# Patient Record
Sex: FEMALE | Race: WHITE | URBAN - METROPOLITAN AREA
[De-identification: names, ages, dates, MRNs, and addresses within clinical notes are randomized per-mention and may not be internally consistent; named-entity substitution may affect disease eponyms.]

---

## 2017-01-06 ENCOUNTER — TRANSFERRED RECORDS (OUTPATIENT)
Dept: HEALTH INFORMATION MANAGEMENT | Facility: CLINIC | Age: 37
End: 2017-01-06

## 2017-01-06 LAB
ALT SERPL-CCNC: 26 U/L (ref 9–52)
AST SERPL-CCNC: 19 U/L (ref 14–36)
CREAT SERPL-MCNC: 0.8 MG/DL (ref 0.7–1.2)
GLUCOSE SERPL-MCNC: 80 MG/DL (ref 74–106)
POTASSIUM SERPL-SCNC: 3.9 MEQ/L (ref 3.5–5.1)

## 2017-01-16 LAB
CREAT SERPL-MCNC: 0.8 MG/DL (ref 0.7–1.2)
GLUCOSE SERPL-MCNC: 77 MG/DL (ref 74–106)
POTASSIUM SERPL-SCNC: 3.5 MEQ/L (ref 3.5–5.1)

## 2017-03-02 ENCOUNTER — TRANSFERRED RECORDS (OUTPATIENT)
Dept: HEALTH INFORMATION MANAGEMENT | Facility: CLINIC | Age: 37
End: 2017-03-02

## 2017-05-26 ENCOUNTER — TRANSFERRED RECORDS (OUTPATIENT)
Dept: HEALTH INFORMATION MANAGEMENT | Facility: CLINIC | Age: 37
End: 2017-05-26

## 2017-05-26 LAB
ALT SERPL-CCNC: 24 U/L (ref 9–52)
AST SERPL-CCNC: 21 U/L (ref 14–36)
CREAT SERPL-MCNC: 0.7 MG/DL (ref 0.7–1.2)
GLUCOSE SERPL-MCNC: 79 MG/DL (ref 74–106)
POTASSIUM SERPL-SCNC: 3.4 MEQ/L (ref 3.5–5.1)

## 2017-06-15 ENCOUNTER — TRANSFERRED RECORDS (OUTPATIENT)
Dept: HEALTH INFORMATION MANAGEMENT | Facility: CLINIC | Age: 37
End: 2017-06-15

## 2017-06-23 ENCOUNTER — TRANSFERRED RECORDS (OUTPATIENT)
Dept: HEALTH INFORMATION MANAGEMENT | Facility: CLINIC | Age: 37
End: 2017-06-23

## 2017-06-23 LAB — INR PPP: 1 (ref 0.9–1.2)

## 2017-06-25 ENCOUNTER — TRANSFERRED RECORDS (OUTPATIENT)
Dept: HEALTH INFORMATION MANAGEMENT | Facility: CLINIC | Age: 37
End: 2017-06-25

## 2017-06-25 LAB
CREAT SERPL-MCNC: 0.6 MG/DL (ref 0.7–1.2)
GLUCOSE SERPL-MCNC: 129 MG/DL (ref 74–106)
POTASSIUM SERPL-SCNC: 4 MEQ/L (ref 3.5–5.1)

## 2017-06-29 ENCOUNTER — TRANSFERRED RECORDS (OUTPATIENT)
Dept: HEALTH INFORMATION MANAGEMENT | Facility: CLINIC | Age: 37
End: 2017-06-29

## 2018-04-06 ENCOUNTER — TELEPHONE (OUTPATIENT)
Dept: NEUROLOGY | Facility: CLINIC | Age: 38
End: 2018-04-06

## 2018-04-23 ENCOUNTER — OFFICE VISIT (OUTPATIENT)
Dept: NEUROLOGY | Facility: CLINIC | Age: 38
End: 2018-04-23
Payer: COMMERCIAL

## 2018-04-23 ENCOUNTER — ALLIED HEALTH/NURSE VISIT (OUTPATIENT)
Dept: NEUROLOGY | Facility: CLINIC | Age: 38
End: 2018-04-23
Payer: COMMERCIAL

## 2018-04-23 VITALS
HEART RATE: 85 BPM | SYSTOLIC BLOOD PRESSURE: 123 MMHG | DIASTOLIC BLOOD PRESSURE: 82 MMHG | BODY MASS INDEX: 25.03 KG/M2 | TEMPERATURE: 99.7 F | HEIGHT: 64 IN | WEIGHT: 146.6 LBS

## 2018-04-23 DIAGNOSIS — R56.9 SEIZURES (H): Primary | ICD-10-CM

## 2018-04-23 DIAGNOSIS — R56.9 CONVULSIONS, UNSPECIFIED CONVULSION TYPE (H): Primary | ICD-10-CM

## 2018-04-23 LAB
ALBUMIN SERPL-MCNC: 3.8 G/DL (ref 3.4–5)
ALP SERPL-CCNC: 40 U/L (ref 40–150)
ALT SERPL W P-5'-P-CCNC: 16 U/L (ref 0–50)
ANION GAP SERPL CALCULATED.3IONS-SCNC: 11 MMOL/L (ref 3–14)
AST SERPL W P-5'-P-CCNC: 22 U/L (ref 0–45)
BILIRUB SERPL-MCNC: 0.2 MG/DL (ref 0.2–1.3)
BUN SERPL-MCNC: 8 MG/DL (ref 7–30)
CALCIUM SERPL-MCNC: 8.6 MG/DL (ref 8.5–10.1)
CHLORIDE SERPL-SCNC: 110 MMOL/L (ref 94–109)
CO2 SERPL-SCNC: 19 MMOL/L (ref 20–32)
CREAT SERPL-MCNC: 0.73 MG/DL (ref 0.52–1.04)
ERYTHROCYTE [DISTWIDTH] IN BLOOD BY AUTOMATED COUNT: 12.6 % (ref 10–15)
GFR SERPL CREATININE-BSD FRML MDRD: 90 ML/MIN/1.7M2
GLUCOSE SERPL-MCNC: 76 MG/DL (ref 70–99)
HCT VFR BLD AUTO: 41.1 % (ref 35–47)
HGB BLD-MCNC: 13.7 G/DL (ref 11.7–15.7)
MCH RBC QN AUTO: 32.5 PG (ref 26.5–33)
MCHC RBC AUTO-ENTMCNC: 33.3 G/DL (ref 31.5–36.5)
MCV RBC AUTO: 98 FL (ref 78–100)
PLATELET # BLD AUTO: 251 10E9/L (ref 150–450)
POTASSIUM SERPL-SCNC: 4 MMOL/L (ref 3.4–5.3)
PROT SERPL-MCNC: 6.9 G/DL (ref 6.8–8.8)
RBC # BLD AUTO: 4.21 10E12/L (ref 3.8–5.2)
SODIUM SERPL-SCNC: 140 MMOL/L (ref 133–144)
TSH SERPL DL<=0.005 MIU/L-ACNC: 1.97 MU/L (ref 0.4–4)
VIT B12 SERPL-MCNC: 253 PG/ML (ref 193–986)
WBC # BLD AUTO: 7.5 10E9/L (ref 4–11)

## 2018-04-23 RX ORDER — BUSPIRONE HYDROCHLORIDE 15 MG/1
22.5 TABLET ORAL 2 TIMES DAILY
COMMUNITY

## 2018-04-23 RX ORDER — TOPIRAMATE 50 MG/1
150 TABLET, FILM COATED ORAL 2 TIMES DAILY
Status: ON HOLD | COMMUNITY
End: 2018-05-09

## 2018-04-23 RX ORDER — PRAZOSIN HYDROCHLORIDE 2 MG/1
6 CAPSULE ORAL AT BEDTIME
COMMUNITY

## 2018-04-23 RX ORDER — TRAZODONE HYDROCHLORIDE 50 MG/1
50 TABLET, FILM COATED ORAL AT BEDTIME
COMMUNITY

## 2018-04-23 RX ORDER — LORAZEPAM 1 MG/1
1 TABLET ORAL
COMMUNITY

## 2018-04-23 RX ORDER — DESVENLAFAXINE 100 MG/1
100 TABLET, EXTENDED RELEASE ORAL DAILY
COMMUNITY

## 2018-04-23 RX ORDER — BUTALBITAL, ACETAMINOPHEN AND CAFFEINE 300; 40; 50 MG/1; MG/1; MG/1
1 CAPSULE ORAL
COMMUNITY

## 2018-04-23 ASSESSMENT — PAIN SCALES - GENERAL: PAINLEVEL: SEVERE PAIN (6)

## 2018-04-23 NOTE — MR AVS SNAPSHOT
After Visit Summary   4/23/2018    Jaja Patton    MRN: [id number]           Patient Information     Date Of Birth          1980        Visit Information        Provider Department      4/23/2018 1:00 PM [facility]        Today's Diagnoses     Convulsions, unspecified convulsion type (H)    -  1       Follow-ups after your visit        Your next 10 appointments already scheduled     Apr 24, 2018  8:00 AM CDT   New Patient Visit with Masood Laurent, PhD [facility] ([facility])    [address], [address]  [place] [address]   [phone]            Apr 24, 2018  1:00 PM CDT   Education with [facility] Nurse 2, [facility] PATIENT EDUCATION   [facility] ([facility])    [address], [address]  [place] [address]   [phone]            Apr 25, 2018 10:00 AM CDT   (Arrive by 9:15 AM)   Video Hookup Visit with [facility] EEG TECH 4   [facility] ([facility])    [facility]  [address] [address]   [phone]           [place]: Your appointment is scheduled at [facility]. [address] [place] [address]              Future tests that were ordered for you today     Open Future Orders        Priority Expected Expires Ordered    MR Brain w/o & w Contrast Routine  4/23/2019 4/23/2018            Who to contact     Please call your clinic at [phone] to:    Ask questions about your health    Make or cancel appointments    Discuss your medicines    Learn about your test results    Speak to your doctor            Additional Information About Your Visit        MyChart Information     ArriveBefore is an electronic gateway that provides easy, online access to your medical records. With ArriveBefore, you can request a clinic appointment, read your test results, renew a prescription or communicate with your  "care team.     To sign up for [employer] visit the website at [email or web address]   You will be asked to enter the access code listed below, as well as some personal information. Please follow the directions to create your username and password.     Your access code is: [email or web address]  Expires: 2018  2:48 PM     Your access code will  in 90 days. If you need help or a new code, please contact your [facility] or call [phone] for assistance.        Care EveryWhere ID     This is your Care EveryWhere ID. This could be used by other organizations to access your [place] medical records  [id number]        Your Vitals Were     Pulse Temperature Height BMI (Body Mass Index)          85 99.7  F (37.6  C) (Temporal) 5' 4\" (162.6 cm) 25.16 kg/m2         Blood Pressure from Last 3 Encounters:   18 123/82    Weight from Last 3 Encounters:   18 146 lb 9.6 oz (66.5 kg)              We Performed the Following     Admit to Inpatient     CBC with platelets     Comprehensive metabolic panel     Topiramate Level     TSH with free T4 reflex     Vitamin B12        Primary Care Provider Fax #    Physician No Ref-Primary [phone]       No address on file        Equal Access to Services     JUAN CONNOR : Hadii aad ku hadasho Soomaali, waaxda luqadaha, qaybta kaalmada adeegyada, waxay joaquinin kvng whitfield . So [facility] [phone].    ATENCIÓN: Si habla español, tiene a waters disposición servicios gratuitos de asistencia lingüística. Llame al [phone].    We comply with applicable federal civil rights laws and [place] laws. We do not discriminate on the basis of race, color, national origin, age, disability, sex, sexual orientation, or gender identity.            Thank you!     Thank you for choosing [facility]  for your care. Our goal is always to provide you with excellent care. Hearing back from our patients is one way we can continue to improve " our services. Please take a few minutes to complete the written survey that you may receive in the mail after your visit with us. Thank you!             Your Updated Medication List - Protect others around you: Learn how to safely use, store and throw away your medicines at www.disposemymeds.org.          This list is accurate as of 4/23/18  2:53 PM.  Always use your most recent med list.                   Brand Name Dispense Instructions for use Diagnosis    busPIRone 15 MG tablet    BUSPAR     Take 15 mg by mouth 3 times daily Take one and one-half tablets by mouth two times daily        CALCIUM 600 PO      Take 600 mg by mouth daily        CLONAZEPAM PO      Take 1 mg by mouth 2 times daily Take 1- and one-half tablets by mouth 2 times daily        desvenlafaxine 100 MG 24 hr tablet    KHEDEZLA     Take by mouth daily        FIORICET -40 MG Caps   Generic drug:  Butalbital-APAP-Caffeine      Take 1 capsule by mouth as needed        FYCOMPA 12 MG tablet   Generic drug:  perampanel      Take 12 mg by mouth At Bedtime        LORazepam 1 MG tablet    ATIVAN     Take 1 mg by mouth every 6 hours as needed for anxiety        METHYLPHENIDATE HCL PO      Take 36 mg by mouth daily        Milk Thistle 175 MG Caps      Take 175 mg by mouth daily        prazosin 2 MG capsule    MINIPRESS     Take 2 mg by mouth At Bedtime Take 3 tablets at bedtime        topiramate 50 MG tablet    TOPAMAX     Take by mouth 2 times daily Take 3 tablets two times a day        traZODone 50 MG tablet    DESYREL     Take 50 mg by mouth At Bedtime Take 2-4 tablets by mouth at bedtime        ZANTAC PO      Take 150 mg by mouth as needed for heartburn

## 2018-04-23 NOTE — MR AVS SNAPSHOT
After Visit Summary   2018    Jaja Patton    MRN: [id number]           Patient Information     Date Of Birth          1980        Visit Information        Provider Department      2018 9:30 AM [facility]        Today's Diagnoses     Seizures (H)    -  1       Follow-ups after your visit        Who to contact     Please call your clinic at [phone] to:    Ask questions about your health    Make or cancel appointments    Discuss your medicines    Learn about your test results    Speak to your doctor            Additional Information About Your Visit        MyChart Information     W[employer] Style is an electronic gateway that provides easy, online access to your medical records. With Woo With Style, you can request a clinic appointment, read your test results, renew a prescription or communicate with your care team.     To sign up for [employer] With Style visit the website at [email or web address]   You will be asked to enter the access code listed below, as well as some personal information. Please follow the directions to create your username and password.     Your access code is: [email or web address]  Expires: 2018  2:48 PM     Your access code will  in 90 days. If you need help or a new code, please contact your [facility] or call [phone] for assistance.        Care EveryWhere ID     This is your Care EveryWhere ID. This could be used by other organizations to access your [place] medical records  [id number]         Blood Pressure from Last 3 Encounters:   No data found for BP    Weight from Last 3 Encounters:   No data found for Wt              Today, you had the following     No orders found for display       Primary Care Provider Fax #    Physician No Ref-Primary [phone]       No address on file        Equal Access to Services     JUAN CONNOR AH: Zaria Staley, yany egan, jaquan castillo  kvng guerdalida yuniorjair bennettangel ah. So [facility] [phone].    ATENCIÓN: Si makila juliana, tiene a waters disposición servicios gratuitos de asistencia lingüística. Jesus al [phone].    We comply with applicable federal civil rights laws and [place] laws. We do not discriminate on the basis of race, color, national origin, age, disability, sex, sexual orientation, or gender identity.            Thank you!     Thank you for choosing [facility]  for your care. Our goal is always to provide you with excellent care. Hearing back from our patients is one way we can continue to improve our services. Please take a few minutes to complete the written survey that you may receive in the mail after your visit with us. Thank you!             Your Updated Medication List - Protect others around you: Learn how to safely use, store and throw away your medicines at www.disposemymeds.org.          This list is accurate as of 4/23/18 11:59 PM.  Always use your most recent med list.                   Brand Name Dispense Instructions for use Diagnosis    busPIRone 15 MG tablet    BUSPAR     Take 22.5 mg by mouth 2 times daily        CALCIUM 600 PO      Take 600 mg by mouth daily        CLONAZEPAM PO      Take 1.5 mg by mouth 2 times daily        desvenlafaxine 100 MG 24 hr tablet    KHEDEZLA     Take 100 mg by mouth daily        FIORICET -40 MG Caps   Generic drug:  Butalbital-APAP-Caffeine      Take 1 capsule by mouth q4-6 hours prn migraine        FYCOMPA 12 MG tablet   Generic drug:  perampanel      Take 12 mg by mouth At Bedtime        LORazepam 1 MG tablet    ATIVAN     Take 1 mg by mouth For seizure activity, may repeat once after 30 minutes if needed for continued seizure activity        Methylphenidate HCl ER 36 MG Tb24      Take 36 mg by mouth daily        Milk Thistle 175 MG Caps      Take 175 mg by mouth daily        prazosin 2 MG capsule    MINIPRESS     Take 6 mg by mouth At Bedtime        topiramate 50 MG  tablet    TOPAMAX     Take 150 mg by mouth 2 times daily        traZODone 50 MG tablet    DESYREL     Take 50 mg by mouth At Bedtime Take 2-4 tablets by mouth at bedtime        ZANTAC PO      Take 150 mg by mouth 2 times daily as needed for heartburn
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
## 2018-04-23 NOTE — PROGRESS NOTES
CPT:62885-73  OP/3hr Video EEG  [facility]  Dr. Gregory

## 2018-04-24 ENCOUNTER — ALLIED HEALTH/NURSE VISIT (OUTPATIENT)
Dept: NEUROLOGY | Facility: CLINIC | Age: 38
End: 2018-04-24
Payer: COMMERCIAL

## 2018-04-24 ENCOUNTER — OFFICE VISIT (OUTPATIENT)
Dept: NEUROLOGY | Facility: CLINIC | Age: 38
End: 2018-04-24
Payer: COMMERCIAL

## 2018-04-24 DIAGNOSIS — R56.9 SEIZURES (H): Primary | ICD-10-CM

## 2018-04-24 DIAGNOSIS — G40.019 LOCALIZATION-RELATED IDIOPATHIC EPILEPSY AND EPILEPTIC SYNDROMES WITH SEIZURES OF LOCALIZED ONSET, INTRACTABLE, WITHOUT STATUS EPILEPTICUS (H): Primary | ICD-10-CM
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
## 2018-04-24 NOTE — MR AVS SNAPSHOT
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 [address] [place] [id number]            Apr 29, 2018  7:00 AM CDT   24 Hour Video Visit with [facility] EEG TECH 4   [facility] ([facility])    [address] [address]   [phone]           [place]: Your appointment is scheduled at [facility]. [address] [place] [id number]              Who to contact     Please call your clinic at [phone] to:    Ask questions about your health    Make or cancel appointments    Discuss your medicines    Learn about your test results    Speak to your doctor            Additional Information About Your Visit        MyChart Information     [employer] is an electronic gateway that provides easy, online access to your medical records. With [employer] you can request a clinic appointment, read your test results, renew a prescription or communicate with your care team.     To sign up for [employer] visit the website at [email or web address]   You will be asked to enter the access code listed below, as well as some personal information.  Please follow the directions to create your username and password.     Your access code is: [email or web address]  Expires: 2018  2:48 PM     Your access code will  in 90 days. If you need help or a new code, please contact your [facility] or call [phone] for assistance.        Care EveryWhere ID     This is your Care EveryWhere ID. This could be used by other organizations to access your [facility] medical records  [id number]         Blood Pressure from Last 3 Encounters:   18 143/73   18 123/82    Weight from Last 3 Encounters:   18 65.3 kg (144 lb)   18 66.5 kg (146 lb 9.6 oz)              We Performed the Following     51835-[id number] TESTING, PER HR/PSYCHOLOGIST     NEUROPSYCH TESTING BY [facility]        Primary Care Provider Fax #    Physician No Ref-Primary [phone]       No address on file        Equal Access to Services     JUAN CONNOR : Hadii jessica Staley, waaxdorene egan, qaybta kaalmada wang, jaquan whitfield . So [facility] [phone].    ATENCIÓN: Si habla español, tiene a waters disposición servicios gratuitos de asistencia lingüística. Llame al [phone].    We comply with applicable federal civil rights laws and [place] laws. We do not discriminate on the basis of race, color, national origin, age, disability, sex, sexual orientation, or gender identity.            Thank you!     Thank you for choosing [facility] EPILEPSY [facility]  for your care. Our goal is always to provide you with excellent care. Hearing back from our patients is one way we can continue to improve our services. Please take a few minutes to complete the written survey that you may receive in the mail after your visit with us. Thank you!             Your Updated Medication List - Protect others around you: Learn how to safely use, store and throw away your medicines at www.disposemymeds.org.          This list is accurate as of 18 11:59  PM.  Always use your most recent med list.                   Brand Name Dispense Instructions for use Diagnosis    busPIRone 15 MG tablet    BUSPAR     Take 22.5 mg by mouth 2 times daily        CALCIUM 600 PO      Take 600 mg by mouth daily        CLONAZEPAM PO      Take 1.5 mg by mouth 2 times daily        desvenlafaxine 100 MG 24 hr tablet    KHEDEZLA     Take 100 mg by mouth daily        FIORICET -40 MG Caps   Generic drug:  Butalbital-APAP-Caffeine      Take 1 capsule by mouth q4-6 hours prn migraine        FYCOMPA 12 MG tablet   Generic drug:  perampanel      Take 12 mg by mouth At Bedtime        LORazepam 1 MG tablet    ATIVAN     Take 1 mg by mouth For seizure activity, may repeat once after 30 minutes if needed for continued seizure activity        Methylphenidate HCl ER 36 MG Tb24      Take 36 mg by mouth daily        Milk Thistle 175 MG Caps      Take 175 mg by mouth daily        prazosin 2 MG capsule    MINIPRESS     Take 6 mg by mouth At Bedtime        topiramate 50 MG tablet    TOPAMAX     Take 150 mg by mouth 2 times daily        traZODone 50 MG tablet    DESYREL     Take 50 mg by mouth At Bedtime Take 2-4 tablets by mouth at bedtime        ZANTAC PO      Take 150 mg by mouth 2 times daily as needed for heartburn

## 2018-04-24 NOTE — PROCEDURES
Procedure Date: 04/23/2018     TYPE OF STUDY: Outpatient video-EEG monitoring    EEG #:  [id number].      DURATION OF RECORDING:  3 hours 14 minutes 59 seconds.      HISTORY:  Outpatient video EEG monitoring on Jaja Patton, a 37-year-old being evaluated for seizures.  She is currently being treated with topiramate 300 mg per day and Fycompa 8 mg daily at bedtime.  She is being evaluated for epilepsy.     TECHNICAL SUMMARY: This continuous video- EEG monitoring procedure was performed with 23 scalp electrodes in 10-20 electrode system placements, and additional scalp, precordial and other surface electrodes used for electrical referencing and artifact detection.  Video monitoring was utilized and periodically reviewed by EEG technologists and the physician for electroclinical correlations.    FINDINGS:  While the patient is clearly awake, 9 Hz posterior dominant rhythm is seen.  There are mild amounts of theta which are probably excessive.  However, the patient appears to be getting drowsy as she is counting backwards.  During periods in which eye blink is persistently present and patient is behaviorally aroused, a fairly sustained 9 Hz posterior dominant rhythm is seen.  Although some theta is noted, it does not appear excessive.  The patient is drowsy during much of the recording with slow eye movements and irregular slowing with slowing of posterior dominant rhythm or absence of posterior dominant rhythm.  Wickety theta is seen bilaterally, more so on the left than on the right.  This is never truly paroxysmal.  Hyperventilation x5 minutes results in some diffuse slowing which persists for more than 2 minutes following cessation of hyperventilation.  Photic stimulation does not induce any abnormalities.        OTHER INTERICTAL ABNORMALITIES:  No epileptiform discharges.      ICTAL ACTIVITIES:  A spell is recorded at the very beginning of the study while camera is on but before electrodes have yet to be attached.   "The patient gradually lowers her chin onto her chest.  She appears to lack head tone.  However, she remains sitting in the chair without slump in one direction or the other.  The chair does not have arm rests.  She is assessed by staff and is unresponsive to commands.  Test items are delivered.  At 09:31:44, she gets up and wants to leave.  Within 10 seconds she says \"where are we... Are we done yet?\"  She acknowledges that she has lost memory for what transpired.  She is unable to recall the test item delivered during the spell.  She is however able to identify family members correctly at 09:32:30.  EEG electrodes were not applied so no comment can be made regarding EEG recordings.  Clinically, automatic activity, jerking or other clinical activity strongly suggesting epilepsy were not seen.  The fact that loss of tone was confined to head was somewhat unusual.      IMPRESSION:  Probably within normal limits.  Posterior dominant rhythm was at a times slow with a lot of theta, but patient was not awake during most of the study.  There was no clear epileptiform activity.  The patient did have a spell at the beginning of the study prior to attachment of all EEG electrodes. She exhibited apparent loss of head tone while otherwise being still and not collapsing.  She was documented to be unresponsive and amnestic.  Clear automatic activity or other obvious clinical seizure activity was not seen.  It is difficult to be certain regarding nature of this spell given that EEG was not recorded.  Seizures cannot be excluded, but some features argue that a nonepileptic etiology should be considered.         HYACINTH STRONG MD             D: 2018   T: 2018   MT: maria ines      Name:     JACKIE IVY   MRN:      [id number]        Account:        [id number]   :      1980           Procedure Date: 2018      Document: [id number]      "

## 2018-04-24 NOTE — PROCEDURES
Jaja Patton comes into clinic today at the request of Dr. Gregory Ordering Provider for General/Baseline/Admission.    This service provided today was under the supervising provider of the day Dr. Gregory, who was available if needed.   Mana Ruiz, RN      Care Coordinator Visit    Name:  Jaja Patton   Date:    2018   :   1980   MRN:  [id number]     Time Spent:  Face-to-face time 40 minutes  See scanned [employer] checklist regarding videos viewed and handouts provided.     I met with the patient and her sister after seizure videos were viewed, as listed in the checklist.     IMPRESSION:   1.  Pretty good story for convulsive seizures.  The occurrence of nocturnal seizures, occurrence of tongue bite and urinary incontinence all speak for generalized tonic-clonic seizures if history is correct.  Some features are less consistent with epilepsy. Major motor attacks appear to be persisting through high doses of Fycompa.  She has been treated with many other medications, but it is not clear whether therapeutic levels were achieved.     2.  Minor spells.  It is not clear whether these are epileptic or not.  She does have multiple risk factors for nonepileptic spells, including chronic pain, early childhood disruption, severe and complex psychiatric disease and significant current psychosocial stressors.  Spell seen on video today more suggestive of nonepileptic spell than epileptic event. However, if the story provided is correct, minor seizures did show EEG changes during a previous evaluation.     3.  Status post multiple surgeries for Arnold-Chiari malformation, including shunt and mesh placement.  These have not helped either her headaches or her seizures.  The precise nature of surgeries is uncertain.     4.  Status post VNS placement, which has also not helped with seizures.     5.  Moderate to severe depression in spite of reasonable doses of SSRIs.  Carries additional diagnoses of  PTSD and generalized anxiety disorder.  Difficult to sort through Axis II diagnosis at this point.   6.  Moderate dysarthria and excessive sleepiness.  She is on quite high Fycompa doses for her weight, and I wonder whether this may be playing a role.     7.  Questionable sensory changes in legs, etiology unclear    The  What Do You Know About Epilepsy  questionnaire was reviewed with the patient.  Areas focused on were what to do if a medication dose is missed, the difference between generalized and partial seizures and healthy lifestyle choices.  We also reviewed all questions that were answered incorrectly.    Basic introduction to epilepsy was done, including the difference between epileptic and nonepileptic events (including physiologic and psychogenic nonepileptic events) and the difference between partial onset and generalized onset epileptic seizures.  We discussed appropriate treatment for each of these and discussed why an accurate diagnosis is important.      We also discussed the importance of adequate sleep and good sleep hygiene, maintaining a healthy diet, taking a multivitamin, and getting exercise.  We discussed that the consumption of alcohol will affect how the body metabolizes medications and that binge drinking can cause withdrawal seizures.  We discussed stress and illness and how these can affect seizure control.  We discussed how over-the counter diet aids, energy drinks, caffeine, and some herbal supplements can affect seizure control.  We also discussed taking showers instead of baths and keeping the shower drain clear so as not to allow water to pool.  We discussed driving. The patient understands that she is responsible for knowing and understanding her  state driving laws as well as for reporting any loss of contact or voluntary control to the Department of Motor Vehicles.  Jaja  also understands the liabilities and risks related to driving.    Introduction to [facility] was  performed, including hospital policies, how seizures are induced, hygiene breaks, and the importance of staff assistance whenever  she  gets out of bed.  Jaja is aware that the length of stay is generally five days, but that this is dependent on what is captured on EEG.     During our general education session Jaja asked many appropriate questions, which were answered to  her satisfaction.

## 2018-04-24 NOTE — MR AVS SNAPSHOT
After Visit Summary   2018    Jaja Patton    MRN: [id number]           Patient Information     Date Of Birth          1980        Visit Information        Provider Department      2018 1:00 PM 2, [facility] Nurse; [facility] PATIENT EDUCATION [facility]        Today's Diagnoses     Seizures (H)    -  1       Follow-ups after your visit        Your next 10 appointments already scheduled     2018  1:00 PM CDT   Education with [facility] Nurse 2, [facility] PATIENT EDUCATION   [facility] ([facility])    [address], [address]  [facility] [id number] [phone]            2018 10:00 AM CDT   (Arrive by 9:15 AM)   Video Hookup Visit with [facility] EEG TECH 4   [facility] EEG ([facility])    [facility]  [address] [facility] [id number] [phone]           Ector: Your appointment is scheduled at [facility]. [address] [place] [id number]              Who to contact     Please call your clinic at [phone] to:    Ask questions about your health    Make or cancel appointments    Discuss your medicines    Learn about your test results    Speak to your doctor            Additional Information About Your Visit        MyChart Information     NextNinet is an electronic gateway that provides easy, online access to your medical records. With Spherix, you can request a clinic appointment, read your test results, renew a prescription or communicate with your care team.     To sign up for NextNinet visit the website at [email or web address]   You will be asked to enter the access code listed below, as well as some personal information. Please follow the directions to create your username and password.     Your access code is: [email or web address]  Expires: 2018  2:48 PM     Your access code will  in 90 days. If you need help or a new code, please contact your  [facility] or call [phone] for assistance.        Care EveryWhere ID     This is your Care EveryWhere ID. This could be used by other organizations to access your [facility] medical records  [id number]         Blood Pressure from Last 3 Encounters:   04/23/18 123/82    Weight from Last 3 Encounters:   04/23/18 146 lb 9.6 oz (66.5 kg)              Today, you had the following     No orders found for display       Primary Care Provider Fax #    Physician No Ref-Primary [phone]       No address on file        Equal Access to Services     [facility]: Hadii aad ku hadasho Soomaali, waaxda luqadaha, qaybta kaalmada adeegyada, waxkeo whitfield . So [facility] [phone].    ATENCIÓN: Si habla español, tiene a waters disposición servicios gratuitos de asistencia lingüística. [facility] [phone].    We comply with applicable federal civil rights laws and [place] laws. We do not discriminate on the basis of race, color, national origin, age, disability, sex, sexual orientation, or gender identity.            Thank you!     Thank you for choosing [facility] EPILEPS[facility]  for your care. Our goal is always to provide you with excellent care. Hearing back from our patients is one way we can continue to improve our services. Please take a few minutes to complete the written survey that you may receive in the mail after your visit with us. Thank you!             Your Updated Medication List - Protect others around you: Learn how to safely use, store and throw away your medicines at www.disposemymeds.org.          This list is accurate as of 4/24/18 11:50 AM.  Always use your most recent med list.                   Brand Name Dispense Instructions for use Diagnosis    busPIRone 15 MG tablet    BUSPAR     Take 15 mg by mouth 3 times daily Take one and one-half tablets by mouth two times daily        CALCIUM 600 PO      Take 600 mg by mouth daily        CLONAZEPAM PO       Take 1 mg by mouth 2 times daily Take 1- and one-half tablets by mouth 2 times daily        desvenlafaxine 100 MG 24 hr tablet    KHEDEZLA     Take by mouth daily        FIORICET -40 MG Caps   Generic drug:  Butalbital-APAP-Caffeine      Take 1 capsule by mouth as needed        FYCOMPA 12 MG tablet   Generic drug:  perampanel      Take 12 mg by mouth At Bedtime        LORazepam 1 MG tablet    ATIVAN     Take 1 mg by mouth every 6 hours as needed for anxiety        METHYLPHENIDATE HCL PO      Take 36 mg by mouth daily        Milk Thistle 175 MG Caps      Take 175 mg by mouth daily        prazosin 2 MG capsule    MINIPRESS     Take 2 mg by mouth At Bedtime Take 3 tablets at bedtime        topiramate 50 MG tablet    TOPAMAX     Take by mouth 2 times daily Take 3 tablets two times a day        traZODone 50 MG tablet    DESYREL     Take 50 mg by mouth At Bedtime Take 2-4 tablets by mouth at bedtime        ZANTAC PO      Take 150 mg by mouth as needed for heartburn

## 2018-04-25 ENCOUNTER — HOSPITAL ENCOUNTER (INPATIENT)
Facility: CLINIC | Age: 38
LOS: 14 days | Discharge: HOME | DRG: 101 | End: 2018-05-09
Attending: PSYCHIATRY & NEUROLOGY | Admitting: PSYCHIATRY & NEUROLOGY
Payer: COMMERCIAL

## 2018-04-25 ENCOUNTER — ALLIED HEALTH/NURSE VISIT (OUTPATIENT)
Dept: NEUROLOGY | Facility: CLINIC | Age: 38
DRG: 101 | End: 2018-04-25
Attending: PSYCHIATRY & NEUROLOGY
Payer: COMMERCIAL

## 2018-04-25 DIAGNOSIS — R56.9 CONVULSIONS, UNSPECIFIED CONVULSION TYPE (H): Primary | ICD-10-CM

## 2018-04-25 DIAGNOSIS — R40.4 EPISODIC ALTERED AWARENESS: Primary | ICD-10-CM

## 2018-04-25 DIAGNOSIS — G40.919 INTRACTABLE EPILEPSY WITHOUT STATUS EPILEPTICUS, UNSPECIFIED EPILEPSY TYPE (H): ICD-10-CM

## 2018-04-25 PROBLEM — G40.909 EPILEPSY (H): Status: ACTIVE | Noted: 2018-04-25

## 2018-04-25 LAB — TOPIRAMATE SERPL-MCNC: 6.6 UG/ML (ref 5–20)

## 2018-04-25 PROCEDURE — 95951 ZZHC EEG VIDEO EACH 24 HR: CPT | Mod: ZF

## 2018-04-25 PROCEDURE — 40000141 ZZH STATISTIC PERIPHERAL IV START W/O US GUIDANCE

## 2018-04-25 PROCEDURE — 25000132 ZZH RX MED GY IP 250 OP 250 PS 637: Performed by: PSYCHIATRY & NEUROLOGY

## 2018-04-25 PROCEDURE — 12000008 ZZH R&B INTERMEDIATE UMMC

## 2018-04-25 RX ORDER — TOPIRAMATE 50 MG/1
150 TABLET, FILM COATED ORAL 2 TIMES DAILY
Status: DISCONTINUED | OUTPATIENT
Start: 2018-04-25 | End: 2018-04-25

## 2018-04-25 RX ORDER — GINSENG 100 MG
CAPSULE ORAL 3 TIMES DAILY PRN
Status: DISCONTINUED | OUTPATIENT
Start: 2018-04-25 | End: 2018-05-09 | Stop reason: HOSPADM

## 2018-04-25 RX ORDER — ACETAMINOPHEN 325 MG/1
650 TABLET ORAL EVERY 4 HOURS PRN
Status: DISCONTINUED | OUTPATIENT
Start: 2018-04-25 | End: 2018-05-09 | Stop reason: HOSPADM

## 2018-04-25 RX ORDER — LIDOCAINE 40 MG/G
CREAM TOPICAL
Status: DISCONTINUED | OUTPATIENT
Start: 2018-04-25 | End: 2018-05-09 | Stop reason: HOSPADM

## 2018-04-25 RX ORDER — METHYLPHENIDATE HYDROCHLORIDE 36 MG/1
36 TABLET ORAL DAILY
Status: DISCONTINUED | OUTPATIENT
Start: 2018-04-26 | End: 2018-05-09 | Stop reason: HOSPADM

## 2018-04-25 RX ORDER — DESVENLAFAXINE 50 MG/1
100 TABLET, FILM COATED, EXTENDED RELEASE ORAL DAILY
Status: DISCONTINUED | OUTPATIENT
Start: 2018-04-26 | End: 2018-05-09 | Stop reason: HOSPADM

## 2018-04-25 RX ORDER — NICOTINE 21 MG/24HR
1 PATCH, TRANSDERMAL 24 HOURS TRANSDERMAL DAILY
Status: DISCONTINUED | OUTPATIENT
Start: 2018-04-25 | End: 2018-05-09 | Stop reason: HOSPADM

## 2018-04-25 RX ORDER — TOPIRAMATE 50 MG/1
100 TABLET, FILM COATED ORAL 2 TIMES DAILY
Status: DISCONTINUED | OUTPATIENT
Start: 2018-04-25 | End: 2018-04-26

## 2018-04-25 RX ORDER — BUTALBITAL, ACETAMINOPHEN AND CAFFEINE 50; 325; 40 MG/1; MG/1; MG/1
1 TABLET ORAL EVERY 6 HOURS PRN
Status: DISCONTINUED | OUTPATIENT
Start: 2018-04-25 | End: 2018-05-09 | Stop reason: HOSPADM

## 2018-04-25 RX ORDER — LORAZEPAM 2 MG/ML
2 INJECTION INTRAMUSCULAR
Status: DISCONTINUED | OUTPATIENT
Start: 2018-04-25 | End: 2018-05-09 | Stop reason: HOSPADM

## 2018-04-25 RX ORDER — PRAZOSIN HYDROCHLORIDE 2 MG/1
6 CAPSULE ORAL AT BEDTIME
Status: DISCONTINUED | OUTPATIENT
Start: 2018-04-25 | End: 2018-05-09 | Stop reason: HOSPADM

## 2018-04-25 RX ORDER — DOCUSATE SODIUM 100 MG/1
100 CAPSULE, LIQUID FILLED ORAL 2 TIMES DAILY PRN
Status: DISCONTINUED | OUTPATIENT
Start: 2018-04-25 | End: 2018-05-09 | Stop reason: HOSPADM

## 2018-04-25 RX ORDER — METHYLPHENIDATE HYDROCHLORIDE 36 MG/1
36 TABLET, EXTENDED RELEASE ORAL DAILY
COMMUNITY

## 2018-04-25 RX ADMIN — ACETAMINOPHEN 650 MG: 325 TABLET, FILM COATED ORAL at 19:55

## 2018-04-25 RX ADMIN — PRAZOSIN HYDROCHLORIDE 6 MG: 5 CAPSULE ORAL at 23:15

## 2018-04-25 RX ADMIN — TOPIRAMATE 100 MG: 50 TABLET ORAL at 21:14

## 2018-04-25 RX ADMIN — PERAMPANEL 10 MG: 2 TABLET ORAL at 23:15

## 2018-04-25 RX ADMIN — CLONAZEPAM 1.5 MG: 1 TABLET ORAL at 17:02

## 2018-04-25 RX ADMIN — BUSPIRONE HYDROCHLORIDE 22.5 MG: 15 TABLET ORAL at 23:15

## 2018-04-25 RX ADMIN — NICOTINE 1 PATCH: 21 PATCH TRANSDERMAL at 13:41

## 2018-04-25 RX ADMIN — TRAZODONE HYDROCHLORIDE 150 MG: 50 TABLET ORAL at 23:14

## 2018-04-25 ASSESSMENT — ACTIVITIES OF DAILY LIVING (ADL)
AMBULATION: 0-->INDEPENDENT
BATHING: 0-->INDEPENDENT
SWALLOWING: 0-->SWALLOWS FOODS/LIQUIDS WITHOUT DIFFICULTY
COGNITION: 0 - NO COGNITION ISSUES REPORTED
FALL_HISTORY_WITHIN_LAST_SIX_MONTHS: NO
TRANSFERRING: 0-->INDEPENDENT
TOILETING: 0-->INDEPENDENT
DRESS: 0-->INDEPENDENT
RETIRED_COMMUNICATION: 0-->UNDERSTANDS/COMMUNICATES WITHOUT DIFFICULTY
RETIRED_EATING: 0-->INDEPENDENT

## 2018-04-25 NOTE — IP AVS SNAPSHOT
[address] [address]    Phone:  [phone]                                       After Visit Summary   4/25/2018    Jaja Patton    MRN: [id number]           After Visit Summary Signature Page     I have received my discharge instructions, and my questions have been answered. I have discussed any challenges I see with this plan with the nurse or doctor.    ..........................................................................................................................................  Patient/Patient Representative Signature      ..........................................................................................................................................  Patient Representative Print Name and Relationship to Patient    ..................................................               ................................................  Date                                            Time    ..........................................................................................................................................  Reviewed by Signature/Title    ...................................................              ..............................................  Date                                                            Time

## 2018-04-25 NOTE — PHARMACY
"The following home medications were NOT continued on inpatient admission per \"Discontinuation of nonessential home medications during hospitalization\" policy: Milk Thistle capsule, 175 mg po daily    If a therapeutic holiday is deemed inappropriate per the prescriber, please notify the pharmacist regarding the medication order.    The pharmacist is available to answer any questions and/or concerns the patient may have regarding discontinuation of non-essential medications.    Please ensure that these medications are restarted as needed upon discharge via the medication reconciliation discharge process and included on the discharge medication reconciliation report.    Thank you,      Pernell Dunn, PharmD, BCPS  April 25, 2018        "
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
## 2018-04-25 NOTE — PROGRESS NOTES
Ulule Vagus Nerve Stimulator interrogated, and adjusted as per MD instructions.  Settings as follows:-  Patient ID SC, Model , Serial # [id number], Implant date 2017-01-16.    NORMAL SETTINGS:  Output Current 1.875 mA, Set to 0.0mA  Pulse/signal Frequency 30 Hz,  Pulse Width 750 ìsec,  On Time 30 sec,  Off Time 0.8 min,  IFI NO.    MAGNET SETTINGS:  Magnet Output Current 2.0mA,  Magnet Pulse Width 750 ìsec,  Magnet On Time 60 sec.    AUTOSTIM SETTINGS:   AutoStim Current 0.0 mA  AutoStim Pulse Width 500ìsec,  AutoStim On Time 60 sec    CONFIGURATION SETTINGS:  Tachycardia Detection ON  Threshold for AutoStim 70%  Heartbeat Detection (sensitivity) 3    OFFICE VISIT    Days Since Previous Office Visit 2      Avg.Stims per day % per day % therapy time since previous office visit   AutoStim  0 0 0   Magnet Stim 0 0 0   Normal Stim 1153 100 43   Total 1153 100 43     Avg. # of inhibited AutoStims per day 0

## 2018-04-25 NOTE — IP AVS SNAPSHOT
MRN:[id number]                      After Visit Summary   4/25/2018    Jaja Patton    MRN: [id number]           Thank you!     Thank you for choosing [facility] for your care. Our goal is always to provide you with excellent care. Hearing back from our patients is one way we can continue to improve our services. Please take a few minutes to complete the written survey that you may receive in the mail after you visit with us. Thank you!        Patient Information     Date Of Birth          1980        Designated Caregiver       Most Recent Value    Caregiver    Will someone help with your care after discharge? yes    Name of designated caregiver Vargas    Phone number of caregiver [phone]    Caregiver address [place]      About your hospital stay     You were admitted on:  April 25, 2018 You last received care in the:  [address] [facility]    You were discharged on:  May 9, 2018        Reason for your hospital stay       Inpatient evaluation for 15 days using continuous video EEG monitoring to assess epilepsy. No epileptic seizures were recorded during this stay. Events of unresponsive state were recorded that did not have EEG changes concerning for epilepsy. The non-epileptic events are likely an unusual physical manifestation of unresolved past and/or present stresses and traumas.                  Who to Call     For medical emergencies, please call 911.  For non-urgent questions about your medical care, please call your primary care provider or clinic, None          Attending Provider     Provider Specialty    Miguel Gregory MD Neurology    Jacinta Johnson MD Neurology       Primary Care Provider Fax #    Physician No Ref-Primary [phone]      After Care Instructions     Activity       Your activity upon discharge: No driving. State laws prohibit operating a motor vehicle following any seizure or other episode with loss of awareness, or inability to sit up  (Varies by state, be aware and comply with the regulations applicable to you). State law may require the licensed  to report any future episode to the DMV . Avoid any activities that might lead to self-injury or injury of others following any event with impaired awareness or impaired motor control. Such activities include but are not limited to holding babies or young children at heights from which they might be injured if dropped, bathing infants or young children in situations in which they might drown without continuous interactive care by an adult who is fully capable at all times during the bath, operating power cutting or other tools, handling firearms, exposure to heights from which you might fall, exposure to vessels with hot cooking oil or water, and swimming alone.            Diet       Follow this diet upon discharge: as prior to admission            Discharge Instructions       Missed Doses:    IF YOU REALIZE WITHIN 24 HOURS:    ...You MISSED ONE DOSE of your anticonvulsant medication(s), take the missed dose immediately unless it is time for your next scheduled dose. If that is the case, take your next scheduled dose, wait two hours, and then take the missed dose.    ...You MISSED TWO OR MORE DOSES, take one of the missed doses immediately, even if it is time for your next scheduled dose. Then call the [facility] clinic to schedule an appointment.     IF YOU REALIZE NOW YOU MISSED A DOSE MORE THAN 24 HOURS AGO, balta it on your calendar. DO NOT take an extra dose.      An extra dose of an antiepileptic drug is not serious. Ordinarily, at most, you may experience increased side effects for several hours.     When to call 911 for Seizures:    Call 911 if:    The person does not start breathing within 1 minute after the seizure. If this happens call 911 immediately and start CPR.    The person sustains an injury    The person has one seizure right after another without regaining consciousness  "in between    A GTC seizure lasts over 5 minutes    The person requests an ambulance  ---  Try to maintain a regular daily routine, with 8 hours of sleep, regular healthy meals, and routine activity/exercise.   Learn stress reduction techniques, such as controlled breathing exercises and meditation/mindfullness.   Avoid getting over tired, alcohol, and unprescribed drugs.  Avoid herbal remedies, as these may contain substances that alter how your body handles medications, or have undesirable effects on you.  Discuss any over the counter medications with your pharmacist or other health care provider.  ---  If your mood worsens and you have thoughts of hurting yourself or others, seek help immediately. This can be done by calling emergency services \"9-1-1\", or calling other mental health resources such as National Suicide Prevention LifeLine 5-529-763-TMAX (2738), your local [facility] help line, or calling your mental health provider.  ---    Knowing how to relax is not something our society teaches. In fact, it teaches us to do the opposite. The relaxation response was developed by the [employer]  and physician Dr. Jean Marie Arnold. You can google both \"Jean Marie Arnold\" and \"Relaxation Response\" to find a multitude of information. Breath work is the center of this process and can reduce anxiety and stress markedly.    Dr. Arnold talks about the need for four things:    1. A quiet environment    3. A passive mental attitude  2. A comfortable position    4. A mental device    The passive mental attitude means that when distracting thoughts come into your mind while deep breathing, you do not  them. Instead of saying to yourself, \"Darn, I just can't calm my mind,\" say, \"Oh, that was interesting,\" and then refocus on the breath. Our brains are a complex network of connections that were meant to fire. It takes practice, lots of practice to focus and breathe. Try not to  your thoughts as good or bad, they just " are.    The mental device can be just about anything. It can be meditation, prayer, saying one sound out loud, whatever you want it to be. The key is to breathe from your belly. Place your hand on your belly. As you inhale to the count of 3 through your nose, feel your hand rising. This helps to remind you to get the breath all the way down into the belly. Exhale to the count of 6 (or double whatever the count of the inhale) through pursed lips. Pursed lips keep the exhale lasting longer and decreases the loss of carbon dioxide. When we breathe fast from our chest, we lose too much carbon dioxide which can cause many of the physical symptoms of anxiety (tremor, lightheadedness, ringing in the ear, feeling of unreality).     So, inhale through your nose, think 'All is well,' Exhale through pursed lips, thinking 'This and every day'. Do this twice a day, first thing in the morning and before dinner in the evening. Do it for a minimum of 10 minutes, a maximum of 20 minutes (Dr. Jean Marie Arnold's Relaxation Response). These two phrases are just suggestions. Change it to whatever resonates with you.    Remember, practice this daily, so that you will have this skill when you need it most.     (Text used with permission from Mira Kruger, RN, BA, HN-BC)                  Follow-up Appointments     Adult [facility] Follow-up and recommended labs and tests       [facility] will call 5/11/2018 at 1pm --o0o--  will call 5/29/2018 at 11.30 am --o0o-- continue to follow with your local health care providers as needed    For questions about your [facility] Follow up, please call [phone]                  Your next 10 appointments already scheduled     May 11, 2018  1:00 PM CDT   Telephone Call with [facility] Nurse 1   [facility]    [address], [address]  [facility] [id number] [phone]           Note: this is not an onsite visit; there is no need to come to the facility.  "           May 29, 2018 11:30 AM CDT   Telephone Call with Miguel Gregory MD   [facility]    [address], [address]  [facility] [address] [phone]           Note: This is not an onsite visit; there is no need to come to the facility.              Further instructions from your care team       Books that may help :-  Taking Control of Your Seizures: Workbook (Treatments That Work) Workbook Edition  by Conner Velasco  (Author), Leonila Khan (Author), Viviana Velasco (Author), GEOVANY Louis (Author)  ---  Treating Nonepileptic Seizures: Therapist Guide (Treatments That Work)   by GEOVANY Louis (Author), Esteban Ta (Author)  ---    \"The primary aim of 'Taking Control of Your Seizures: Workbook' is to improve the lives of patients with seizures. Both epileptic seizures and nonepileptic seizures (YAO) are prevalent and potentially disabling. The Workbook is designed to be used by a patient with seizures in conjunction with his or her counselor. The Workbook contains step-by-step guidelines that enable patients to take control of their seizures and their lives. The  'Treating Nonepileptic Seizures: Therapist Guide' enhances effectiveness by providing [id number] instructions for counselors who use the Workbook with patients with YAO. The authors developed this treatment approach based on extensive clinical experience and research with epilepsy and YAO. Many patients who have completed the Taking Control process experience fewer seizures, reduced symptoms, and a greater sense of well-being.\"            5/9/2018      Times of Days  morning  evening       Medication  Oxcarbazepine Tablet Size Number of Tablets/Capsules Total Daily Dosage   5/7 - 5/13/18 300 mg 1  1    600 mg   5/14 - 5/20/18 300 mg 1.5  1.5    900 mg   5/21/18 onwards 300 mg  2  2    1200 mg       Carry this with you at all times.  CONTINUE TAKING YOUR OTHER " "MEDICATIONS AS PREVIOUSLY DIRECTED.      * * *Do not store medications in the bathroom.  Keep medications away from children!* * *       Pending Results     No orders found from 2018 to 2018.            Statement of Approval     Ordered          18 1158  I have reviewed and agree with all the recommendations and orders detailed in this document.  EFFECTIVE NOW     Approved and electronically signed by:  Jacinta Johnson MD             Admission Information     Date & Time Provider Department Dept. Phone    2018 Jacinta Johnson MD [address] [facility] [employer] [phone]      Your Vitals Were     Blood Pressure Pulse Temperature Respirations Weight Pulse Oximetry    111/54 (BP Location: Left arm) 71 97.9  F (36.6  C) (Oral) 16 68.7 kg (151 lb 7.3 oz) 97%    BMI (Body Mass Index)                   26 kg/m2           MyChart Information     [employer] lets you send messages to your doctor, view your test results, renew your prescriptions, schedule appointments and more. To sign up, go to [email or web address] . Click on \"Log in\" on the left side of the screen, which will take you to the Welcome page. Then click on \"Sign up Now\" on the right side of the page.     You will be asked to enter the access code listed below, as well as some personal information. Please follow the directions to create your username and password.     Your access code is: [email or web address]  Expires: 2018  2:48 PM     Your access code will  in 90 days. If you need help or a new code, please call your [facility] clinic or [phone].        Care EveryWhere ID     This is your Care EveryWhere ID. This could be used by other organizations to access your [facility] medical records  [id number]        Equal Access to Services     JUAN CONNOR : Zaria Staley, yany egan, qaledyta kajaquan faust. So [facility] [phone].    ATENCIÓN: Si titus suggs " a waters disposición servicios gratuitos de asistencia lingüística. Jesus burns [phone].    We comply with applicable federal civil rights laws and [place] laws. We do not discriminate on the basis of race, color, national origin, age, disability, sex, sexual orientation, or gender identity.               Review of your medicines      START taking        Dose / Directions    OXcarbazepine 300 MG tablet   Commonly known as:  TRILEPTAL   Used for:  Intractable epilepsy without status epilepticus, unspecified epilepsy type (H)        Dose:  300 mg   Take 1 tablet (300 mg) by mouth 2 times daily Increase by 300mg each week until taking 600mg twice daily   Quantity:  124 tablet   Refills:  0         CONTINUE these medicines which may have CHANGED, or have new prescriptions. If we are uncertain of the size of tablets/capsules you have at home, strength may be listed as something that might have changed.        Dose / Directions    clonazePAM 1 MG tablet   Commonly known as:  klonoPIN   This may have changed:    - medication strength  - how much to take   Used for:  Episodic altered awareness        Dose:  1 mg   Take 1 tablet (1 mg) by mouth 2 times daily   Refills:  0         CONTINUE these medicines which have NOT CHANGED        Dose / Directions    busPIRone 15 MG tablet   Commonly known as:  BUSPAR        Dose:  22.5 mg   Take 22.5 mg by mouth 2 times daily   Refills:  0       CALCIUM 600 PO        Dose:  600 mg   Take 600 mg by mouth daily   Refills:  0       desvenlafaxine 100 MG 24 hr tablet   Commonly known as:  KHEDEZLA        Dose:  100 mg   Take 100 mg by mouth daily   Refills:  0       FIORICET -40 MG Caps   Generic drug:  Butalbital-APAP-Caffeine        Dose:  1 capsule   Take 1 capsule by mouth q4-6 hours prn migraine   Refills:  0       FYCOMPA 12 MG tablet   Generic drug:  perampanel        Dose:  12 mg   Take 12 mg by mouth At Bedtime   Refills:  0       LORazepam 1 MG tablet   Commonly known as:   ATIVAN        Dose:  1 mg   Take 1 mg by mouth For seizure activity, may repeat once after 30 minutes if needed for continued seizure activity   Refills:  0       Methylphenidate HCl ER 36 MG Tb24        Dose:  36 mg   Take 36 mg by mouth daily   Refills:  0       Milk Thistle 175 MG Caps        Dose:  175 mg   Take 175 mg by mouth daily   Refills:  0       prazosin 2 MG capsule   Commonly known as:  MINIPRESS        Dose:  6 mg   Take 6 mg by mouth At Bedtime   Refills:  0       traZODone 50 MG tablet   Commonly known as:  DESYREL        Dose:  50 mg   Take 50 mg by mouth At Bedtime Take 2-4 tablets by mouth at bedtime   Refills:  0       ZANTAC PO        Dose:  150 mg   Take 150 mg by mouth 2 times daily as needed for heartburn   Refills:  0         STOP taking     topiramate 50 MG tablet   Commonly known as:  TOPAMAX                Where to get your medicines      These medications were sent to [facility] [place], [place] - [address]  [address] [facility] [address]     Phone:  [phone]     OXcarbazepine 300 MG tablet         Information about where to get these medications is not yet available     ! Ask your nurse or doctor about these medications     clonazePAM 1 MG tablet                Protect others around you: Learn how to safely use, store and throw away your medicines at www.disposemymeds.org.             Medication List: This is a list of all your medications and when to take them. Check marks below indicate your daily home schedule. Keep this list as a reference.      Medications           Morning Afternoon Evening Bedtime As Needed    busPIRone 15 MG tablet   Commonly known as:  BUSPAR   Take 22.5 mg by mouth 2 times daily   Last time this was given:  22.5 mg on 5/9/2018  9:06 AM                                CALCIUM 600 PO   Take 600 mg by mouth daily   Last time this was given:  1,500 mg on 5/9/2018  9:06 AM                                clonazePAM 1 MG  tablet   Commonly known as:  klonoPIN   Take 1 tablet (1 mg) by mouth 2 times daily   Last time this was given:  1 mg on 5/9/2018  9:06 AM                                desvenlafaxine 100 MG 24 hr tablet   Commonly known as:  KHEDEZLA   Take 100 mg by mouth daily                                FIORICET -40 MG Caps   Take 1 capsule by mouth q4-6 hours prn migraine   Generic drug:  Butalbital-APAP-Caffeine                                FYCOMPA 12 MG tablet   Take 12 mg by mouth At Bedtime   Last time this was given:  12 mg on 5/8/2018  9:13 PM   Generic drug:  perampanel                                LORazepam 1 MG tablet   Commonly known as:  ATIVAN   Take 1 mg by mouth For seizure activity, may repeat once after 30 minutes if needed for continued seizure activity                                Methylphenidate HCl ER 36 MG Tb24   Take 36 mg by mouth daily                                Milk Thistle 175 MG Caps   Take 175 mg by mouth daily                                OXcarbazepine 300 MG tablet   Commonly known as:  TRILEPTAL   Take 1 tablet (300 mg) by mouth 2 times daily Increase by 300mg each week until taking 600mg twice daily   Last time this was given:  300 mg on 5/9/2018  9:06 AM                                prazosin 2 MG capsule   Commonly known as:  MINIPRESS   Take 6 mg by mouth At Bedtime   Last time this was given:  6 mg on 5/8/2018  9:13 PM                                traZODone 50 MG tablet   Commonly known as:  DESYREL   Take 50 mg by mouth At Bedtime Take 2-4 tablets by mouth at bedtime   Last time this was given:  150 mg on 5/8/2018  9:13 PM                                ZANTAC PO   Take 150 mg by mouth 2 times daily as needed for heartburn

## 2018-04-25 NOTE — MR AVS SNAPSHOT
After Visit Summary   2018    Jaja Patton    MRN: [id number]           Patient Information     Date Of Birth          1980        Visit Information        Provider Department      2018 10:00 AM [facility] EEG TECH 4 [facility] EEG        Today's Diagnoses     Convulsions, unspecified convulsion type (H)    -  1       Follow-ups after your visit        Who to contact     Please call your clinic at [phone] to:    Ask questions about your health    Make or cancel appointments    Discuss your medicines    Learn about your test results    Speak to your doctor            Additional Information About Your Visit        MyChart Information     [employer] is an electronic gateway that provides easy, online access to your medical records. With [employer], you can request a clinic appointment, read your test results, renew a prescription or communicate with your care team.     To sign up for e(ye)BRAINt visit the website at [email or web address]   You will be asked to enter the access code listed below, as well as some personal information. Please follow the directions to create your username and password.     Your access code is: [email or web address]  Expires: 2018  2:48 PM     Your access code will  in 90 days. If you need help or a new code, please contact your [facility] or call [phone] for assistance.        Care EveryWhere ID     This is your Care EveryWhere ID. This could be used by other organizations to access your [facility] medical records  [id number]         Blood Pressure from Last 3 Encounters:   18 154/90   18 123/82    Weight from Last 3 Encounters:   18 65.3 kg (144 lb)   18 66.5 kg (146 lb 9.6 oz)              Today, you had the following     No orders found for display         Today's Medication Changes      Notice     This visit is during an admission. Changes to the med list made in this visit will be reflected in the After  Visit Summary of the admission.             Primary Care Provider Fax #    Physician No Ref-Primary [phone]       No address on file        Equal Access to Services     JUAN CONNOR : Zaria aad ku hadtoniaidalia Rebeka, yany gillesralf, ashley piña, jaquan nascimento. So [facility] [phone].    ATENCIÓN: Si habla español, tiene a waters disposición servicios gratuitos de asistencia lingüística. Llame al [phone].    We comply with applicable federal civil rights laws and [place] laws. We do not discriminate on the basis of race, color, national origin, age, disability, sex, sexual orientation, or gender identity.            Thank you!     Thank you for choosing [facility]  for your care. Our goal is always to provide you with excellent care. Hearing back from our patients is one way we can continue to improve our services. Please take a few minutes to complete the written survey that you may receive in the mail after your visit with us. Thank you!             Your Updated Medication List - Protect others around you: Learn how to safely use, store and throw away your medicines at www.disposemymeds.org.      Notice     This visit is during an admission. Changes to the med list made in this visit will be reflected in the After Visit Summary of the admission.

## 2018-04-26 ENCOUNTER — ALLIED HEALTH/NURSE VISIT (OUTPATIENT)
Dept: NEUROLOGY | Facility: CLINIC | Age: 38
DRG: 101 | End: 2018-04-26
Attending: PSYCHIATRY & NEUROLOGY
Payer: COMMERCIAL

## 2018-04-26 DIAGNOSIS — R56.9 CONVULSIONS, UNSPECIFIED CONVULSION TYPE (H): Primary | ICD-10-CM

## 2018-04-26 PROCEDURE — 95951 ZZHC EEG VIDEO EACH 24 HR: CPT | Mod: ZF

## 2018-04-26 PROCEDURE — 40000556 ZZH STATISTIC PERIPHERAL IV START W US GUIDANCE

## 2018-04-26 PROCEDURE — 25000132 ZZH RX MED GY IP 250 OP 250 PS 637: Performed by: PSYCHIATRY & NEUROLOGY

## 2018-04-26 PROCEDURE — 12000001 ZZH R&B MED SURG/OB UMMC

## 2018-04-26 RX ORDER — TOPIRAMATE 50 MG/1
50 TABLET, FILM COATED ORAL 2 TIMES DAILY
Status: DISCONTINUED | OUTPATIENT
Start: 2018-04-26 | End: 2018-04-27 | Stop reason: DRUGHIGH

## 2018-04-26 RX ADMIN — BUSPIRONE HYDROCHLORIDE 22.5 MG: 15 TABLET ORAL at 09:30

## 2018-04-26 RX ADMIN — PRAZOSIN HYDROCHLORIDE 6 MG: 5 CAPSULE ORAL at 22:30

## 2018-04-26 RX ADMIN — TOPIRAMATE 50 MG: 50 TABLET ORAL at 20:44

## 2018-04-26 RX ADMIN — CLONAZEPAM 1.5 MG: 1 TABLET ORAL at 09:30

## 2018-04-26 RX ADMIN — METHYLPHENIDATE HYDROCHLORIDE 36 MG: 36 TABLET ORAL at 09:30

## 2018-04-26 RX ADMIN — TRAZODONE HYDROCHLORIDE 150 MG: 50 TABLET ORAL at 22:31

## 2018-04-26 RX ADMIN — NICOTINE 1 PATCH: 21 PATCH TRANSDERMAL at 09:31

## 2018-04-26 RX ADMIN — Medication 1500 MG: at 09:30

## 2018-04-26 RX ADMIN — TOPIRAMATE 100 MG: 50 TABLET ORAL at 09:30

## 2018-04-26 RX ADMIN — DESVENLAFAXINE SUCCINATE 100 MG: 50 TABLET, EXTENDED RELEASE ORAL at 09:30

## 2018-04-26 RX ADMIN — PERAMPANEL 8 MG: 2 TABLET ORAL at 22:30

## 2018-04-26 RX ADMIN — BUSPIRONE HYDROCHLORIDE 22.5 MG: 15 TABLET ORAL at 20:44

## 2018-04-26 RX ADMIN — CLONAZEPAM 1.5 MG: 1 TABLET ORAL at 16:41

## 2018-04-26 ASSESSMENT — PATIENT HEALTH QUESTIONNAIRE - PHQ9: SUM OF ALL RESPONSES TO PHQ QUESTIONS 1-9: 20

## 2018-04-26 NOTE — PROGRESS NOTES
Spoke with patient's , Vargas at patient's request. Vargas was wondering about length of stay. Discussed average stay is 5-7 days, but presurgical work-ups may be longer, closer to 7-10 days, but it all depends how quickly we can record seizures. Did discuss that we will try give 24-48 hour notice prior to discharge. He has no further questions at this time but is available if we have any questions for him.    Zuly Naik PA-C

## 2018-04-26 NOTE — PROGRESS NOTES
[facility] [place]   Epilepsy Service Daily Note      Interval History:   Patient had no seizures overnight. No major complaints.     Review of System:   No nausea, No vomiting, no headaches, no dizziness, no chest pain.     Medications:   Antiepileptic Medications Home Doses: Fycompa 12 hs, topiramate 150-150  Antiepileptic Medications Current Doses: Fycompa 10 hs, topiramate 100-100    Exam: Blood pressure 105/55, pulse 72, temperature 97.9  F (36.6  C), temperature source Oral, resp. rate 16, weight 65.3 kg (144 lb), SpO2 98 %.   General: NAD  Head: NC/AT  Neuro: Alert and oriented. Speech fluent. Hearing intact to normal conversation. EOM's intact. Face symmetric.   EEG: formal report pending   Assessment and Plan:   1. Jaja Patton is a 37 year old right handed female with a reported h/o Arnold- Chiari malformation, intractable epilepsy since age 32, s/p VNS placement who is being admitted for characterization and presurgical evaluation. No target seizures recorded yet.     -continue vEEG monitoring  -decrease topiramate to 50-50  -decrease Fycompa to 8 hs        Zuly Naik PA-C    Type of target event identified: staring spell with altered awareness, convulsion  Number of events: more needed, 5  Discharge medication plan: To be decided  Further Imaging studies needed prior to discharge: possibly   Discharge transportation: not discussed  Other pertinent issues/goals for discharge: none    Total time: 15 minute was spent in the care of this patient. The patient agrees with the above mentioned plan of care. I answered all the patient's questions and addressed immediate concerns. More than 50% of time spent consisted of counseling and coordinating care, including discussion of the diagnostic significance of EEG findings, anti-seizure medication management, and planning for discharge home

## 2018-04-26 NOTE — PLAN OF CARE
Problem: Patient Care Overview  Goal: Plan of Care/Patient Progress Review  Pt admitted for EEG monitoring this morning. EEG leads intact. IV not administered til this evening; IV team along w/RN attempted to have pts IV inserted multiple times before pt agreed to it. Pt reports generalized pain, scheduled medications given. Pt became tearful on/off throughout shift. Up w/1 + gb. No events recorded this shift, although pt has hx of getting combative after events. Continue to monitor and follow POC

## 2018-04-26 NOTE — PROCEDURES
Procedure Date: 2018      EEG #:  [id number]      TYPE OF STUDY:  DAY 1 OF 24-HOUR VIDEO EEG      DATE OF RECORDING:  2018.        SOURCE FILE DURATION:  13 hours 19 minutes 2 seconds.      CLINICAL SUMMARY:  The patient is a 37-year-old right-handed woman who presented for evaluation of intractable focal epilepsy.  The patient underwent video EEG monitoring for characterization and possible presurgical evaluation.     TECHNICAL SUMMARY: This continuous video- EEG monitoring procedure was performed with 23 scalp electrodes in 10-20 electrode system placement, and additional scalp, precordial and other surface electrodes used for electrical referencing and artifact detection.  Video monitoring was utilized and periodically reviewed by EEG technologists and the physician for electroclinical correlations.     INTERICTAL ACTIVITY:  During quiet wakefulness, there was 10 Hz alpha activity over the posterior head regions which was symmetric and attenuated by eye opening.  Mild degree of theta slowing was present during waking, which was not excessive.  Stage II sleep was manifested as vertex waves, symmetric sleep spindles and K complexes.  No epileptiform discharges were present.      Activation maneuvers: photic stimulation did not induce photic driving response or abnormal activities on the EEG.  Hyperventilation was not performed.      CLINICAL/ICTAL EVENTS:  No electrographic seizures or paroxysmal behavioral events were recorded.      IMPRESSION:  Normal video EEG.  No epileptiform discharges were present.  No electrographic or clinical seizures were recorded.  Clinical correlation advised.         YVON GREGORY MD             D: 2018   T: 2018   MT: CLIFFORD      Name:     JACKIE IVY   MRN:      [id number]        Account:        [id number]   :      1980           Procedure Date: 2018      Document: [id number]

## 2018-04-26 NOTE — PLAN OF CARE
Problem: Patient Care Overview  Goal: Plan of Care/Patient Progress Review  VSS. Quiet voice; slow to respond; A&Ox4; Numbness to bilat toes intermittently at baseline. PRN tylenol effective for H/A pain as needed. Voiding spont. Reg diet. Up SBA+gb. VEEG leads in place; no events reported or witnessed thus far. Pt  would like to speak to an MD from Neuro-Epilepsy team today.  name Vargas # [phone]. PMH Arnold Chiari malformation; VNS placement 2015. Continue with POC.

## 2018-04-26 NOTE — MR AVS SNAPSHOT
After Visit Summary   2018    Jaja Patton    MRN: [id number]           Patient Information     Date Of Birth          1980        Visit Information        Provider Department      2018 7:00 AM [facility] EEG TECH 4 [facility] EEG        Today's Diagnoses     Convulsions, unspecified convulsion type (H)    -  1       Follow-ups after your visit        Who to contact     Please call your clinic at [phone] to:    Ask questions about your health    Make or cancel appointments    Discuss your medicines    Learn about your test results    Speak to your doctor            Additional Information About Your Visit        MyChart Information     [employer] is an electronic gateway that provides easy, online access to your medical records. With [employer], you can request a clinic appointment, read your test results, renew a prescription or communicate with your care team.     To sign up for [employer] visit the website at [email or web address]   You will be asked to enter the access code listed below, as well as some personal information. Please follow the directions to create your username and password.     Your access code is: [email or web address]  Expires: 2018  2:48 PM     Your access code will  in 90 days. If you need help or a new code, please contact your [facility] or call [phone] for assistance.        Care EveryWhere ID     This is your Care EveryWhere ID. This could be used by other organizations to access your [place] medical records  [id number]         Blood Pressure from Last 3 Encounters:   18 105/55   18 123/82    Weight from Last 3 Encounters:   18 65.3 kg (144 lb)   18 66.5 kg (146 lb 9.6 oz)              Today, you had the following     No orders found for display         Today's Medication Changes      Notice     This visit is during an admission. Changes to the med list made in this visit will be reflected in the After  Visit Summary of the admission.             Primary Care Provider Fax #    Physician No Ref-Primary [phone]       No address on file        Equal Access to Services     JUAN CONNOR : Zaira aad ku hadtoniaidalia Rebeka, yany gillesralf, ashley piña, jaquan nascimento. So [facility] [phone].    ATENCIÓN: Si habla español, tiene a waters disposición servicios gratuitos de asistencia lingüística. Llame al [phone].    We comply with applicable federal civil rights laws and [place] laws. We do not discriminate on the basis of race, color, national origin, age, disability, sex, sexual orientation, or gender identity.            Thank you!     Thank you for choosing [facility]  for your care. Our goal is always to provide you with excellent care. Hearing back from our patients is one way we can continue to improve our services. Please take a few minutes to complete the written survey that you may receive in the mail after your visit with us. Thank you!             Your Updated Medication List - Protect others around you: Learn how to safely use, store and throw away your medicines at www.disposemymeds.org.      Notice     This visit is during an admission. Changes to the med list made in this visit will be reflected in the After Visit Summary of the admission.

## 2018-04-26 NOTE — PLAN OF CARE
Problem: Patient Care Overview  Goal: Plan of Care/Patient Progress Review  [id number]: VEEG monitoring.  PMH: Arnold Chiari malformation; VNS placement 2005.  Leads in place.  No seizures/events this shift, thus far.  A&Ox4.  Quiet, slow to respond at times.  Numbness to bilateral bottom of feet, baseline.  Denies pain.  Voiding spont.  Tolerating regular diet well.  Up with SBA and GB.  Neuro-Epilepsy team updated her  today, via phone.  : Vargas.  Phone# [phone].

## 2018-04-27 ENCOUNTER — ALLIED HEALTH/NURSE VISIT (OUTPATIENT)
Dept: NEUROLOGY | Facility: CLINIC | Age: 38
DRG: 101 | End: 2018-04-27
Attending: PSYCHIATRY & NEUROLOGY
Payer: COMMERCIAL

## 2018-04-27 ENCOUNTER — TRANSFERRED RECORDS (OUTPATIENT)
Dept: HEALTH INFORMATION MANAGEMENT | Facility: CLINIC | Age: 38
End: 2018-04-27

## 2018-04-27 DIAGNOSIS — R56.9 CONVULSIONS, UNSPECIFIED CONVULSION TYPE (H): Primary | ICD-10-CM

## 2018-04-27 PROCEDURE — 25000132 ZZH RX MED GY IP 250 OP 250 PS 637: Performed by: PSYCHIATRY & NEUROLOGY

## 2018-04-27 PROCEDURE — 95951 ZZHC EEG VIDEO EACH 24 HR: CPT | Mod: ZF

## 2018-04-27 PROCEDURE — 12000001 ZZH R&B MED SURG/OB UMMC

## 2018-04-27 RX ADMIN — CLONAZEPAM 1.5 MG: 1 TABLET ORAL at 19:06

## 2018-04-27 RX ADMIN — Medication 1500 MG: at 08:39

## 2018-04-27 RX ADMIN — BUSPIRONE HYDROCHLORIDE 22.5 MG: 15 TABLET ORAL at 08:39

## 2018-04-27 RX ADMIN — DOCUSATE SODIUM 100 MG: 100 CAPSULE, LIQUID FILLED ORAL at 10:41

## 2018-04-27 RX ADMIN — PERAMPANEL 8 MG: 2 TABLET ORAL at 21:00

## 2018-04-27 RX ADMIN — PRAZOSIN HYDROCHLORIDE 6 MG: 5 CAPSULE ORAL at 21:00

## 2018-04-27 RX ADMIN — TRAZODONE HYDROCHLORIDE 150 MG: 50 TABLET ORAL at 21:00

## 2018-04-27 RX ADMIN — BUSPIRONE HYDROCHLORIDE 22.5 MG: 15 TABLET ORAL at 21:00

## 2018-04-27 RX ADMIN — METHYLPHENIDATE HYDROCHLORIDE 36 MG: 36 TABLET ORAL at 08:38

## 2018-04-27 RX ADMIN — NICOTINE 1 PATCH: 21 PATCH TRANSDERMAL at 08:39

## 2018-04-27 RX ADMIN — TOPIRAMATE 50 MG: 50 TABLET ORAL at 08:39

## 2018-04-27 RX ADMIN — CLONAZEPAM 1.5 MG: 1 TABLET ORAL at 08:38

## 2018-04-27 RX ADMIN — DESVENLAFAXINE SUCCINATE 100 MG: 50 TABLET, EXTENDED RELEASE ORAL at 08:38
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
## 2018-04-27 NOTE — PLAN OF CARE
Problem: Patient Care Overview  Goal: Plan of Care/Patient Progress Review  Pt on 6A for VEEG monitor for characterization/pre-surgical workup. VSS. Neuros intact except pt has flat, withdrawn affect, and numbness and tingling to bottom of feet at baseline. PIV SL. Voids spontaneously. Up with SBA. Tolerates regular diet. Pt had one episode of staring and unresponsiveness around 2050 which lasted around one minute. Event button was pushed. Vital signs were stable. Unable to complete ROAR d/t unresponsiveness. After event pt was oriented but tired, went to sleep. No other events noted. Continue to monitor and follow current POC.

## 2018-04-27 NOTE — MR AVS SNAPSHOT
After Visit Summary   2018    Jaja Patton    MRN: [id number]           Patient Information     Date Of Birth          1980        Visit Information        Provider Department      2018 7:00 AM [facility] EEG TECH 4 [facility] EEG        Today's Diagnoses     Convulsions, unspecified convulsion type (H)    -  1       Follow-ups after your visit        Who to contact     Please call your clinic at [phone] to:    Ask questions about your health    Make or cancel appointments    Discuss your medicines    Learn about your test results    Speak to your doctor            Additional Information About Your Visit        MyChart Information     [employer] is an electronic gateway that provides easy, online access to your medical records. With [employer], you can request a clinic appointment, read your test results, renew a prescription or communicate with your care team.     To sign up for [employer] visit the website at [email or web address]   You will be asked to enter the access code listed below, as well as some personal information. Please follow the directions to create your username and password.     Your access code is: [email or web address]  Expires: 2018  2:48 PM     Your access code will  in 90 days. If you need help or a new code, please contact your [facility] or call [phone] for assistance.        Care EveryWhere ID     This is your Care EveryWhere ID. This could be used by other organizations to access your [place] medical records  [id number]         Blood Pressure from Last 3 Encounters:   18 129/64   18 123/82    Weight from Last 3 Encounters:   18 65.3 kg (144 lb)   18 66.5 kg (146 lb 9.6 oz)              Today, you had the following     No orders found for display         Today's Medication Changes      Notice     This visit is during an admission. Changes to the med list made in this visit will be reflected in the After  Visit Summary of the admission.             Primary Care Provider Fax #    Physician No Ref-Primary [phone]       No address on file        Equal Access to Services     JUAN CONNOR : Zaria aad ku hadtoniaidalia Rebeka, yany gillesralf, ashley piña, jaquan nascimento. So [facility] [phone].    ATENCIÓN: Si habla español, tiene a waters disposición servicios gratuitos de asistencia lingüística. Llame al [phone].    We comply with applicable federal civil rights laws and [place] laws. We do not discriminate on the basis of race, color, national origin, age, disability, sex, sexual orientation, or gender identity.            Thank you!     Thank you for choosing [facility]  for your care. Our goal is always to provide you with excellent care. Hearing back from our patients is one way we can continue to improve our services. Please take a few minutes to complete the written survey that you may receive in the mail after your visit with us. Thank you!             Your Updated Medication List - Protect others around you: Learn how to safely use, store and throw away your medicines at www.disposemymeds.org.      Notice     This visit is during an admission. Changes to the med list made in this visit will be reflected in the After Visit Summary of the admission.

## 2018-04-27 NOTE — PROGRESS NOTES
Name: Jaja Patton  MR#: [id number]  YOB: 1980   Date of Exam: 04/25/2018      Neuropsychology Laboratory  [facility]  [address]  [place]  [address] [phone]    NEUROPSYCHOLOGICAL EVALUATION    IDENTIFYING INFORMATION  Jaja Patton is a 37 year old, right handed, disabled , with 12 years of formal education. She was unaccompanied to the evaluation.     BACKGROUND INFORMATION / INTERVIEW FINDINGS    Please see background information for this patient in my outpatient note from 04/24/2018. This evaluation was completed over two sessions because the patient had a seizure during test administration in the outpatient setting. I did see the patient, face-to-face, on April 27, 2018 while she was hospitalized.    BEHAVIORAL OBSERVATIONS  As alluded to above, the patient had a seizure in her outpatient neuropsychological examination. That exam was aborted. Following her admission, the remainder of the evaluation was completed in the hospital. Her speech was mildly dysfluent, and with occasional poor articulation. Infrequent paraphasias were noted on one measure. Word finding and prosody were normal. Comprehension was normal. She worked slowly. She was falling asleep during memory recall on one test. She was distractible. She provided off-topic comments to tests. She became distracted by noises in her hospital room, and became tearful towards in the testing. Overall, the current results may be an underestimate of her cognitive capacity, in some cases.     RESULTS OF EXAM  I will summarize the outpatient exam results here as well in order to have one listing of the complete neuropsychology results from this two-part exam. This information is also contained in the outpatient report. Her performances on measures of neuropsychological functioning during the outpatient exam were as follows:      She was fully oriented to time, place, and various  aspects of personal information. She did not obtain a passing score on a stand-alone measure of cognitive performance validity. Learning of words in a list format was borderline impaired. Short delayed recall of list words was borderline impaired. 30 minute delayed recall of list words was borderline impaired. Delayed recognition of list words was impaired, although it should be noted that she recognized as many words as she was able to produce in any of the learning trials, and did not produce any false positive errors. Her drawing of a complicated geometric figure was normal. Short delayed recall of the figure was low average. 30 minute delayed recall of the figure was low average. Delayed recognition of the figure s elements was average. Visual problem solving with blocks was average. Nonverbal reasoning for incomplete matrices was average.    Her performances on measures of neuropsychological functioning during the inpatient exam were as follows:      Performance on a measure of single word reading was average. She obtained a passing score on one embedded metric of cognitive performance validity, although her performance on a second embedded metric of cognitive performance validity was below criterion. Auditory attention for digits was borderline impaired. Mental calculations were borderline impaired. Visual attention for spatial sequences was borderline impaired. Learning of story information was high average. Delayed recall of story information was superior. Delayed recognition of story information was high average (it should be noted that this was the test that was being completed as she had her seizure during the outpatient exam, and she was able to recite some of the story details when it was being presented in the inpatient setting). Delayed recall of store information was superior. Delayed recognition of story details was average. Immediate memory for simple geometric figures was performed mildly below  expectation. Learning of faces was low average. Delayed recognition of faces was borderline impaired. Visuoperceptual matching of faces was performed within normal limits. Comprehension of phrases and short stories was average. Verbal associative fluency was impaired. Semantic verbal fluency was impaired. Naming to confrontation was borderline impaired. Verbal abstract reasoning was average. Fund of knowledge was average. Speeded visual sequencing under focused attention was borderline impaired. A similar measure with a divided attention component was borderline impaired. Measures of speeded word reading, speeded color naming, and speeded inhibition of an overlearned response were all performed in the impaired range. Speeded matching and cancellation was borderline impaired. Speeded visuomotor coding was impaired. Speeded fine motor dexterity was impaired, bilaterally.    She endorsed items consistent with severe symptoms of depression, and severe symptoms of anxiety on self-report measures. On a longer measure of personality and emotional functioning, she responded in a manner that is consistent with possible over-reporting of psychological symptoms, and in particular so for symptoms related to cognition and memory. Interpreted cautiously, her responses suggest a depression syndrome that is characterized by tendencies toward somatization, demoralization, anxiety, and difficulties getting along with others. Wide-ranging somatic concerns were noted. Additional elevations suggest high levels of anxiety, self-doubt, and fears. Additional elevations suggest aggressive tendencies, a tendency to avoid social situations, and disinterest in affiliating with others.    IMPRESSIONS  Ms. Patton demonstrated weaknesses that raise some question of dysfunction of the left frontal lobe. However, there are a number of factors that complicated the interpretation of the results from this exam. First, there is objective evidence of  inconsistent engagement with testing. Second, and perhaps relatedly, she is severely depressed and anxious. Third, some of the testing was completed in the outpatient setting immediately prior to having a seizure, and the bulk of the testing was completed while she was an inpatient and in a room with distractions. Fourth, she is prescribed medication that is known to impact the functioning of the left frontal lobe (topiramate). Thus, I do not have great confidence that the data in this exam are fully reflective of her cognitive capacity. If we are to take the current results at face value, mild weaknesses were identified in verbal attention, verbal learning, verbal fluency, and word retrieval. Verbal cognitive speed and bilateral psychomotor speed are also poor. There is variability in verbal memory performance. Other cognitive abilities were generally intact and performed in keeping with her likely average range cognitive baseline. In addition to the depression and anxiety noted above, she likely also has more chronic mental health issues. I would predict reduce the variability in her cognition following improved management of her mental health.    RECOMMENDATIONS    1. In spite of treatment, she is severely depressed and anxious. Continued psychiatric and psychotherapeutic care are both strongly recommended.    2. If she is determined to be a resective surgery candidate, a [place] exam is recommended.    3. Follow-up neuropsychological assessment could be considered in the future if clinically indicated. If she completes surgery in the future, a follow-up neuropsychological evaluation is recommended six months after this procedure. The current results can be used as a baseline at that time.    Masood Laurent, Ph.D., L.P., ABPP-CN   / Licensed Psychologist [id number]  Department of Rehabilitation Medicine  Division of Adult Neuropsychology  [facility]    Time spent: two hours professional  time, including record review, data integration, and report writing (CPT 24542); two hours of testing administered by a psychometrist and interpreted by a neuropsychologist (CPT 71552). Diagnoses: G40.019, F33.2, F41.9.

## 2018-04-27 NOTE — PLAN OF CARE
Problem: Patient Care Overview  Goal: Plan of Care/Patient Progress Review  Outcome: No Change   04/27/18 1406   OTHER   Plan Of Care Reviewed With patient   Plan of Care Review   Progress no change     Neuro: A&Ox4.   Cardiac: SR. VSS.   Respiratory: Sating 95% on RA.  GI/: No BM, colace was given, per pt's request. Adequate urine output.   Diet/appetite: Tolerating regular diet. Eating well.  Activity:  Stand by assist up to chair and in halls.  Pain: At acceptable level on current regimen.   Skin: Intact, no new deficits noted.  LDA's: PIV saline locked.  Plan: Continue EEG monitoring. No event today. Notify primary team with changes

## 2018-04-27 NOTE — PROGRESS NOTES
__ Orientation            Time          __-0____        Place        ____2____        Personal Info.     ____4____    WAIS-IV   FSIQ____VCI_____PRI_____ WMI_71__PSI_56       Raw  Age SS  __Similarities  __21__  __8__  __Vocabulary  _____  _____  __Information  __16___          __11___  __Comprehension _______ _______  __Block Design  __34___ __8__  __Matrix Reas.  __15__  __8___  __Visual Puzzles _______ _______  __Picture Comp. ______  ______  __Figure Weights _______ _______  __Digit Span  __ 18___ __5__  __Arithmetic  __ 8__   __5__  __L-N Sequencing _______ _______  __Symbol Search __19___ ___5__  __Coding  __27__  ___3___  __Cancellation  _______ _______    __AVLT  Trial   1         2         3          4            5          B          6            _4_    _6_     _6_     _11_     _10_     _6_     _6_      Raw  zscore  Learning   _37___  -2.00    Short Delay   _6___  -1.93  30 min. delayed recall  _7___   -1.43  Recognition hits   _11__   -2.67  Recognition false positives _0__                __Keny-Dari/Mamie Complex Figure Test    Raw  T-score   %ile  Copy   __35.0_         -                 >16  Imm. Recall __18_  __40___ _16_  30  Delay __19_  ___42___ _21__  Recognition __21__   ___49___ __46__  Time               _432                     _?1__    __BVRT  (form C)  Copy E-10 rating ____/4     Raw              zscore  Correct  ___6____ _8__  Incorrect ___6___ _3__    __WRAT-4   Reading SS = 95     %ile = 37    GE = 12.2    __COWAT   Raw__16___ Tscore__24___   __Semantic Fluency/Animals   Raw = 12  Tscore = 27  __BNT   Raw = 49/60 Tscore = 35  __Complex Ideational Material   Raw = 11/12 Tscore = 44    __Trail Making Test   A =  48       Errors = 0    Tscore = 30   B = 89         Errors = 0    Tscore = 36  __Stroop                       Raw         %ile        Word = _45_      _20__        Color =  _34_      _20__        C/W   =  _20_     _25__    __Benton Facial Recognition   Raw = 47 Interp:  WNL    __Grooved Pegboard   RH = 112          Tscore = 20      Drops = 2   LH = 114           TScore = 22     Drops = 2    __BDI-II   Raw_29__ Interp.__Severe___   __BAI     Raw_35_ Interp. __Severe___  __MMPI-2RF    __WMS-III   LM1 =48  SS = 12   LM2 = 35  SS = 14   LM2R = 27 Zscore = .42   Faces1= 31 SS=7    Faces2= 28 SS=5   Spatial Span=9   SS=4    __Dot Counting   Escore = 31

## 2018-04-28 ENCOUNTER — ALLIED HEALTH/NURSE VISIT (OUTPATIENT)
Dept: NEUROLOGY | Facility: CLINIC | Age: 38
DRG: 101 | End: 2018-04-28
Attending: PSYCHIATRY & NEUROLOGY
Payer: COMMERCIAL

## 2018-04-28 DIAGNOSIS — G40.909 EPILEPSY (H): Primary | ICD-10-CM

## 2018-04-28 PROCEDURE — 95951 ZZHC EEG VIDEO EACH 24 HR: CPT | Mod: ZF

## 2018-04-28 PROCEDURE — 12000001 ZZH R&B MED SURG/OB UMMC

## 2018-04-28 PROCEDURE — 99221 1ST HOSP IP/OBS SF/LOW 40: CPT

## 2018-04-28 PROCEDURE — 25000132 ZZH RX MED GY IP 250 OP 250 PS 637: Performed by: PSYCHIATRY & NEUROLOGY

## 2018-04-28 RX ADMIN — DESVENLAFAXINE SUCCINATE 100 MG: 50 TABLET, EXTENDED RELEASE ORAL at 10:56

## 2018-04-28 RX ADMIN — CLONAZEPAM 1.5 MG: 1 TABLET ORAL at 08:38

## 2018-04-28 RX ADMIN — ACETAMINOPHEN 650 MG: 325 TABLET, FILM COATED ORAL at 13:48

## 2018-04-28 RX ADMIN — BUSPIRONE HYDROCHLORIDE 22.5 MG: 15 TABLET ORAL at 22:42

## 2018-04-28 RX ADMIN — METHYLPHENIDATE HYDROCHLORIDE 36 MG: 36 TABLET ORAL at 08:38

## 2018-04-28 RX ADMIN — Medication 1500 MG: at 08:38

## 2018-04-28 RX ADMIN — NICOTINE 1 PATCH: 21 PATCH TRANSDERMAL at 10:56

## 2018-04-28 RX ADMIN — BUTALBITAL, ACETAMINOPHEN AND CAFFEINE 1 TABLET: 50; 325; 40 TABLET ORAL at 15:56

## 2018-04-28 RX ADMIN — DOCUSATE SODIUM 100 MG: 100 CAPSULE, LIQUID FILLED ORAL at 22:24

## 2018-04-28 ASSESSMENT — VISUAL ACUITY
OU: NORMAL ACUITY;GLASSES
OU: NORMAL ACUITY

## 2018-04-28 NOTE — PROGRESS NOTES
Epilepsy Service Progress Note   Interval History:   The patient did not have any seizures or events over night.  She is doing well. Denies headaches or other complaints.      Physical Exam:   /69 (BP Location: Right arm)  Pulse 78  Temp 97.3  F (36.3  C) (Oral)  Resp 16  Wt 65.3 kg (144 lb)  SpO2 97%  BMI 24.72 kg/m2  Alert, awake, NAD, no aphasia, hypophonic monotone voice, no dysarthria, face symmetric,, EOMI, normal tone, bulk and strength, slow FNF, but no dysmetria or tremor.     Medications:   Antiepileptic Medications Home Doses: Fycompa 12 hs, topiramate 150-150  Antiepileptic Medications Current Doses: Fycompa 8 hs, topiramate 0    Current Facility-Administered Medications   Medication     acetaminophen (TYLENOL) tablet 650 mg     bacitracin ointment     busPIRone (BUSPAR) tablet 22.5 mg     butalbital-acetaminophen-caffeine (FIORICET/ESGIC) per tablet 1 tablet     calcium carbonate (OS-JOVANI 600 mg [place]. [place]) tablet 1,500 mg     clonazePAM (klonoPIN) tablet 1.5 mg     desvenlafaxine succinate (PRISTIQ) 24 hr tablet 100 mg     docusate sodium (COLACE) capsule 100 mg     lidocaine (LMX4) kit     lidocaine (viscous) (XYLOCAINE) 2 % solution 5 mL     lidocaine 1 % 1 mL     LORazepam (ATIVAN) injection 2 mg     magnesium hydroxide (MILK OF MAGNESIA) suspension 30 mL     methylphenidate ER (CONCERTA) CR tablet 36 mg     nicotine (NICODERM CQ) 21 MG/24HR 24 hr patch 1 patch     nicotine Patch in Place     nicotine patch REMOVAL     perampanel (FYCOMPA) tablet 8 mg     prazosin (MINIPRESS) capsule 6 mg     ranitidine (ZANTAC) tablet 150 mg     sodium chloride (PF) 0.9% PF flush 3 mL     sodium chloride (PF) 0.9% PF flush 3 mL     traZODone (DESYREL) tablet 150 mg     EEG findings: Normal    Assessment:  37 year old  right handed female with a h/o Arnold-Chiari malformation, s/p surgery x 2, intractable epilepsy, s/p VNS placement was admitted for characterization and possible pre surgical evaluation. No  seizures or events over night.  On 4/26, had 2 episodes of staring and unresponsiveness with no abnormal EEG correlate. Need to capture events which sound like GTC seizures.  Off Topiramate since yesterday, Fycompa reduced to 8 mg/hs, VNS is off.  We discussed decreasing Fycompa further, and hold her clonazepam for tonight and sleep-deprive her tonight.    Plan:   - Continue vEEG  - Decrease Fycompa to 6 mg/hs  - hold clonazepam tonight for sleep-deprivation  - sleep deprive tonight  - Seizure/fall precautions        Jacinta Johnson MD

## 2018-04-28 NOTE — PLAN OF CARE
Problem: Patient Care Overview  Goal: Plan of Care/Patient Progress Review  Outcome: No Change  D/I:Neuros intact x basline N at bottom of feet.Up with SBA.Voiding spont.Regular diet.Has had no events. Tylenol x 1 for HA.Off Topomax. Klonidine and Fycompa will be decreased tonight.  P:Will be sleep deprived tonight.

## 2018-04-28 NOTE — PLAN OF CARE
Problem: Patient Care Overview  Goal: Plan of Care/Patient Progress Review  Outcome: No Change  Pt here for Veeg monitoring. A &O x4, flat affect. VSS, compliant with seizure precautions. Neuros intact with B bottoms of feet numbness as at baseline. No events witnessed or reported this evening. PIV x1 in place. Voiding/eating without difficulty.   Plan: Continue with POC.

## 2018-04-28 NOTE — PLAN OF CARE
Problem: Patient Care Overview  Goal: Plan of Care/Patient Progress Review  Outcome: No Change  Pt. has h/o Arnold-Chiari malformation, s/p surgery x2, intractable epilepsy, s/p VNS replacement in 2015, admitted for seizure characterization. VEEG leads in place, no events reported or witnessed this shift.VSS.  A&Ox4, flat affect. Neuros intact, denied baseline N/T on bottom of feet. PIV, SL. Tolerating regular diet. Voids spontaneously without difficulty. No BM overnight. Pt denied any pain/nausea. Slept well t/o the night. Continue to monitor and follow POC.

## 2018-04-28 NOTE — MR AVS SNAPSHOT
After Visit Summary   2018    Jaja Patton    MRN: [id number]           Patient Information     Date Of Birth          1980        Visit Information        Provider Department      2018 7:00 AM P EEG TECH 4 P EEG        Today's Diagnoses     Epilepsy (H)    -  1       Follow-ups after your visit        Your next 10 appointments already scheduled     2018  7:00 AM CDT   24 Hour Video Visit with [facility] EEG TECH 4   [facility] ([facility])    [facility]  [address] [id number] [phone]           [place]: Your appointment is scheduled at [facility]. [address] [place] [id number]              Who to contact     Please call your clinic at [phone] to:    Ask questions about your health    Make or cancel appointments    Discuss your medicines    Learn about your test results    Speak to your doctor            Additional Information About Your Visit        MyChart Information     [employer] is an electronic gateway that provides easy, online access to your medical records. With [facility], you can request a clinic appointment, read your test results, renew a prescription or communicate with your care team.     To sign up for [employer] visit the website at [email or web address]   You will be asked to enter the access code listed below, as well as some personal information. Please follow the directions to create your username and password.     Your access code is: [email or web address]  Expires: 2018  2:48 PM     Your access code will  in 90 days. If you need help or a new code, please contact your [facility] or call [phone] for assistance.        Care EveryWhere ID     This is your Care EveryWhere ID. This could be used by other organizations to access your [place] medical records  [id number]         Blood Pressure from Last 3  Encounters:   04/28/18 111/69   04/23/18 123/82    Weight from Last 3 Encounters:   04/25/18 65.3 kg (144 lb)   04/23/18 66.5 kg (146 lb 9.6 oz)              Today, you had the following     No orders found for display         Today's Medication Changes      Notice     This visit is during an admission. Changes to the med list made in this visit will be reflected in the After Visit Summary of the admission.             Primary Care Provider Fax #    Physician No Ref-Primary [phone]       No address on file        Equal Access to Services     [facility] : Zaria Staley, yany egan, ashley kaalmadorene piña, jaquan whitfield . So [facility] [phone].    ATENCIÓN: Si habla español, tiene a waters disposición servicios gratuitos de asistencia lingüística. Llame al [phone].    We comply with applicable federal civil rights laws and [place] laws. We do not discriminate on the basis of race, color, national origin, age, disability, sex, sexual orientation, or gender identity.            Thank you!     Thank you for choosing [facility]  for your care. Our goal is always to provide you with excellent care. Hearing back from our patients is one way we can continue to improve our services. Please take a few minutes to complete the written survey that you may receive in the mail after your visit with us. Thank you!             Your Updated Medication List - Protect others around you: Learn how to safely use, store and throw away your medicines at www.disposemymeds.org.      Notice     This visit is during an admission. Changes to the med list made in this visit will be reflected in the After Visit Summary of the admission.

## 2018-04-29 ENCOUNTER — ALLIED HEALTH/NURSE VISIT (OUTPATIENT)
Dept: NEUROLOGY | Facility: CLINIC | Age: 38
DRG: 101 | End: 2018-04-29
Attending: PSYCHIATRY & NEUROLOGY
Payer: COMMERCIAL

## 2018-04-29 DIAGNOSIS — G40.909 EPILEPSY (H): Primary | ICD-10-CM

## 2018-04-29 PROCEDURE — 25000132 ZZH RX MED GY IP 250 OP 250 PS 637: Performed by: PSYCHIATRY & NEUROLOGY

## 2018-04-29 PROCEDURE — 95951 ZZHC EEG VIDEO EACH 24 HR: CPT | Mod: ZF

## 2018-04-29 PROCEDURE — 12000001 ZZH R&B MED SURG/OB UMMC

## 2018-04-29 RX ADMIN — NICOTINE 1 PATCH: 21 PATCH TRANSDERMAL at 07:57

## 2018-04-29 RX ADMIN — PERAMPANEL 6 MG: 2 TABLET ORAL at 21:01

## 2018-04-29 RX ADMIN — CLONAZEPAM 1.5 MG: 1 TABLET ORAL at 07:57

## 2018-04-29 RX ADMIN — BUSPIRONE HYDROCHLORIDE 22.5 MG: 15 TABLET ORAL at 07:57

## 2018-04-29 RX ADMIN — PRAZOSIN HYDROCHLORIDE 6 MG: 5 CAPSULE ORAL at 21:01

## 2018-04-29 RX ADMIN — Medication 1500 MG: at 07:57

## 2018-04-29 RX ADMIN — BUSPIRONE HYDROCHLORIDE 22.5 MG: 15 TABLET ORAL at 20:56

## 2018-04-29 RX ADMIN — DESVENLAFAXINE SUCCINATE 100 MG: 50 TABLET, EXTENDED RELEASE ORAL at 07:57

## 2018-04-29 RX ADMIN — TRAZODONE HYDROCHLORIDE 150 MG: 50 TABLET ORAL at 21:01

## 2018-04-29 RX ADMIN — METHYLPHENIDATE HYDROCHLORIDE 36 MG: 36 TABLET ORAL at 07:57

## 2018-04-29 RX ADMIN — CLONAZEPAM 1.5 MG: 1 TABLET ORAL at 17:04

## 2018-04-29 ASSESSMENT — VISUAL ACUITY
OU: NORMAL ACUITY
OU: NORMAL ACUITY;GLASSES
OU: NORMAL ACUITY;GLASSES

## 2018-04-29 NOTE — PLAN OF CARE
Problem: Patient Care Overview  Goal: Plan of Care/Patient Progress Review  Outcome: No Change  Pt admitted on 6A for VEEG. VSS. VEEG leads in place. C/o HA, given Fioricet with mod relief. Neuros intact. Regular diet tolerating well. Good PO intake. PIV SL. Voiding spont. Up with SBA and GB. Pt compliant with seizure protocol. Pt to be sleep deprived tonight, sleep between 2am-6am to remain awake until 4/28 10 pm. Continue to monitor and follow POC.
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
## 2018-04-29 NOTE — PLAN OF CARE
Problem: Patient Care Overview  Goal: Plan of Care/Patient Progress Review  Outcome: No Change  D/I:No events. Is on sleep deprivation and has been awake all shift.Went for a long walk. PO'ing well.Meds have been decreasing.Had BM. Voiding spont.  P:Hygeine break tomorrow.

## 2018-04-29 NOTE — MR AVS SNAPSHOT
After Visit Summary   2018    Jaja Patton    MRN: [id number]           Patient Information     Date Of Birth          1980        Visit Information        Provider Department      2018 7:00 AM [facility] EEG TECH 4 [facility] EEG        Today's Diagnoses     Epilepsy (H)    -  1       Follow-ups after your visit        Who to contact     Please call your clinic at [phone] to:    Ask questions about your health    Make or cancel appointments    Discuss your medicines    Learn about your test results    Speak to your doctor            Additional Information About Your Visit        MyChart Information     [employer] is an electronic gateway that provides easy, online access to your medical records. With [employer], you can request a clinic appointment, read your test results, renew a prescription or communicate with your care team.     To sign up for [employer] visit the website at [email or web address]   You will be asked to enter the access code listed below, as well as some personal information. Please follow the directions to create your username and password.     Your access code is: [email or web address]  Expires: 2018  2:48 PM     Your access code will  in 90 days. If you need help or a new code, please contact your [facility] or call [phone] for assistance.        Care EveryWhere ID     This is your Care EveryWhere ID. This could be used by other organizations to access your [facility] medical records  [id number]         Blood Pressure from Last 3 Encounters:   18 129/79   18 123/82    Weight from Last 3 Encounters:   18 65.3 kg (144 lb)   18 66.5 kg (146 lb 9.6 oz)              Today, you had the following     No orders found for display         Today's Medication Changes      Notice     This visit is during an admission. Changes to the med list made in this visit will be reflected in the After Visit Summary of the admission.              Primary Care Provider Fax #    Physician No Ref-Primary [phone]       No address on file        Equal Access to Services     JUAN CONNOR : Hadii aad ku hadtoniaidalia Rebeka, yany gillesjazha, ashley piña, jaquan joaquinines stafford laalejandraangel nascimento. So [facility] [phone].    ATENCIÓN: Si habla español, tiene a waters disposición servicios gratuitos de asistencia lingüística. Llame al [phone].    We comply with applicable federal civil rights laws and [place] laws. We do not discriminate on the basis of race, color, national origin, age, disability, sex, sexual orientation, or gender identity.            Thank you!     Thank you for choosing [facility]  for your care. Our goal is always to provide you with excellent care. Hearing back from our patients is one way we can continue to improve our services. Please take a few minutes to complete the written survey that you may receive in the mail after your visit with us. Thank you!             Your Updated Medication List - Protect others around you: Learn how to safely use, store and throw away your medicines at www.disposemymeds.org.      Notice     This visit is during an admission. Changes to the med list made in this visit will be reflected in the After Visit Summary of the admission.

## 2018-04-29 NOTE — PROGRESS NOTES
Epilepsy Service Progress Note   Interval History:   The patient was sleep-deprived last night.  She slept 4 hours.  Feels tired, denies headache, dizziness, vomiting or other complaints.     Physical Exam:   /78 (BP Location: Right arm)  Pulse 67  Temp 96.5  F (35.8  C) (Oral)  Resp 16  Wt 65.3 kg (144 lb)  SpO2 99%  BMI 24.72 kg/m2  Alert, awake, NAD, no aphasia, hypophonic monotone voice, no dysarthria, face symmetric,, EOMI, normal tone, bulk and strength, slow FNF and slight intention tremor, no dysmetria.    Home AEDs: Fycompa 12 hs, topiramate 150-150  Current AEDs: Fycompa 6 mg hs    Current Facility-Administered Medications   Medication     acetaminophen (TYLENOL) tablet 650 mg     bacitracin ointment     busPIRone (BUSPAR) tablet 22.5 mg     butalbital-acetaminophen-caffeine (FIORICET/ESGIC) per tablet 1 tablet     calcium carbonate (OS-JOVANI 600 mg [place]. Ca) tablet 1,500 mg     clonazePAM (klonoPIN) tablet 1.5 mg     desvenlafaxine succinate (PRISTIQ) 24 hr tablet 100 mg     docusate sodium (COLACE) capsule 100 mg     lidocaine (LMX4) kit     lidocaine (viscous) (XYLOCAINE) 2 % solution 5 mL     lidocaine 1 % 1 mL     LORazepam (ATIVAN) injection 2 mg     magnesium hydroxide (MILK OF MAGNESIA) suspension 30 mL     methylphenidate ER (CONCERTA) CR tablet 36 mg     nicotine (NICODERM CQ) 21 MG/24HR 24 hr patch 1 patch     nicotine Patch in Place     nicotine patch REMOVAL     perampanel (FYCOMPA) tablet 6 mg     prazosin (MINIPRESS) capsule 6 mg     ranitidine (ZANTAC) tablet 150 mg     sodium chloride (PF) 0.9% PF flush 3 mL     sodium chloride (PF) 0.9% PF flush 3 mL     traZODone (DESYREL) tablet 150 mg     EEG findings: Normal    Assessment:      37 year old  right handed female with a h/o Arnold-Chiari malformation, s/p surgery x 2, intractable epilepsy, s/p VNS placement was admitted for characterization and possible pre surgical evaluation. No seizures or events over night.  On 4/26, had 2  episodes of staring and unresponsiveness with no abnormal EEG correlate. Need to capture events which sound like GTC seizures.  Off Topiramate since Friday, Fycompa reduced to 6 mg/hs, VNS is off.     Plan:   - Continue vEEG monitoring  - Continue off topiramate and reduced dose of Fycompa  - Seizure/fall precautions      Jacinta Johnson MD

## 2018-04-29 NOTE — PLAN OF CARE
Problem: Patient Care Overview  Goal: Plan of Care/Patient Progress Review  Outcome: No Change  Pt. has h/o Arnold-Chiari malformation, s/p surgery x2, intractable epilepsy, s/p VNS replacement in 2015, admitted for seizure characterization. VEEG leads in place. Sleep deprived until 0200, slept [id number]; to remain awake until 2200. No events reported or witnessed this shift.VSS. A&Ox4, flat affect. Neuros intact, denied baseline N/T on bottom of feet. PIV, SL. Tolerating regular diet. Voids spontaneously without difficulty. No BM overnight, c/o constipation, denied any interventions, waiting to see how evening Colace works. Pt denied any dizziness/nausea. Had c/o  HA moderately managed w/ PRN Fioricet. Ambulated in hallx1. Continue to monitor and follow POC.

## 2018-04-29 NOTE — PROCEDURES
Procedure Date: 2018      EEG #:  -3      DATE OF RECORDING/SERVICE DATE:  2018      TYPE OF STUDY:  This is day 3 of 24-hour video EEG.      DURATION OF STUDY:  23 hours, 9 minutes, 53 seconds.     CLINICAL SUMMARY:  The patient is a 37-year-old right-handed woman who presented for evaluation of intractable focal epilepsy.  The patient underwent video EEG monitoring for characterization and possible presurgical evaluation.      MEDICATIONS:  BuSpar, Klonopin, Fycompa 8 mg at bedtime, trazodone and Concerta.     TECHNICAL SUMMARY: This continuous video- EEG monitoring procedure was performed with 23 scalp electrodes in 10-20 electrode system placement, and additional scalp, precordial and other surface electrodes used for electrical referencing and artifact detection.  Video monitoring was utilized and periodically reviewed by EEG technologists and the physician for electroclinical correlations.    INTERICTAL ACTIVITY:  During quiet wakefulness, there was 10 Hz alpha activity over the posterior head regions which was symmetric and attenuated by eye opening.  Mild degree of theta slowing was present during waking, which was not excessive.  Stage II sleep was manifested as vertex waves, symmetric sleep spindles and K complexes.  No epileptiform discharges were present.     CLINICAL AND ICTAL EVENTS:  No electrographic or clinical seizures or paroxysmal behavioral events were recorded.      IMPRESSION:  Normal video EEG.  No epileptiform discharges or pathological slowing were present.  No electrographic seizures or paroxysmal behavioral events were recorded.         YVON GREGORY MD             D: 2018   T: 2018   MT: CLIFFORD      Name:     JACKIE IVY   MRN:      [id number]        Account:        [id number]   :      1980           Procedure Date: 2018      Document: [id number]

## 2018-04-29 NOTE — PROCEDURES
Procedure Date: 2018      EEG #:  -4      DATE OF RECORDING/SERVICE DATE:  2018      TYPE OF STUDY:  This is day 4 of 24-hour video EEG.      DURATION OF STUDY:  23 hours, 54 minutes, 39 seconds.     CLINICAL SUMMARY:  The patient is a 37-year-old right-handed woman who presented for evaluation of intractable focal epilepsy.  The patient underwent video EEG monitoring for characterization and possible presurgical evaluation.      MEDICATIONS:  Klonopin 1.5 mg b.i.d., Fycompa 8 mg at bedtime, BuSpar, trazodone and Concerta.     TECHNICAL SUMMARY: This continuous video- EEG monitoring procedure was performed with 23 scalp electrodes in 10-20 electrode system placement, and additional scalp, precordial and other surface electrodes used for electrical referencing and artifact detection.  Video monitoring was utilized and periodically reviewed by EEG technologists and the physician for electroclinical correlations.     INTERICTAL ACTIVITY:  During quiet wakefulness there was 10 Hz activity over the posterior head regions which was symmetric and attenuated by eye opening.  Drowsiness was manifested as dropout of the posterior dominant rhythm and diffuse theta activity and horizontal eye movements.  Stage II sleep was manifested as vertex waves, symmetric sleep spindles and K complexes.  No epileptiform discharges were present.      CLINICAL AND ICTAL EVENTS:  No electrographic or clinical seizures were recorded.      IMPRESSION:  Normal video EEG.  No epileptiform discharges were present.  No electrographic or clinical seizures were recorded.  Clinical correlation advised.         YVON GREGORY MD             D: 2018   T: 2018   MT: CLIFFORD      Name:     JACKIE IVY   MRN:      [id number]        Account:        [id number]   :      1980           Procedure Date: 2018      Document: [id number]

## 2018-04-29 NOTE — PROCEDURES
Procedure Date: 04/26/2018      EEG #:  -2      DATE OF RECORDING/SERVICE DATE:  04/26/2018      TYPE OF STUDY:  This is day 2 of 24-hour video EEG.      DURATION OF STUDY:  23 hours 48 minutes 31 seconds.     CLINICAL SUMMARY:  The patient is a 37-year-old right-handed woman who presented for evaluation of intractable focal epilepsy.  The patient underwent video EEG monitoring for characterization and possible presurgical evaluation.      MEDICATIONS:  BuSpar, Klonopin, reduced dose of Fycompa and topiramate, trazodone, Concerta.     TECHNICAL SUMMARY: This continuous video- EEG monitoring procedure was performed with 23 scalp electrodes in 10-20 electrode system placement, and additional scalp, precordial and other surface electrodes used for electrical referencing and artifact detection.  Video monitoring was utilized and periodically reviewed by EEG technologists and the physician for electroclinical correlations.     INTERICTAL ACTIVITY:  During quiet wakefulness, there was moderate amplitude 10 Hz alpha activity over the posterior head regions which was symmetric and attenuated by eye opening.  Drowsiness was manifested as dropout of the posterior dominant rhythm and diffuse theta activity and horizontal eye movements.  Sharp transients were present over the left temporal head region during drowsiness which appeared to be wicket waves.  Stage II sleep was manifested as vertex waves, symmetric sleep spindles and K complexes.  No clear epileptiform discharges were present.      CLINICAL AND ICTAL EVENTS:  No electrographic seizures were recorded.  The patient had 2 events on this date of monitoring.  First event occurred at 11:18.  The patient was unresponsive to the nurse.  She was pointing with her index fingers to the ceiling and was moving her index fingers back and forth and was mumbling.  EEG did not show any epileptiform discharges or ictal activity during this event.   At 20:48:49, the patient had  an event with staring and unresponsiveness.  She was sitting on the bed, holding a cup with her right hand.  Staff who was in the room pushed the event button as the patient was unresponsive when she called her.  She dropped her cup of water.  The patient did not have complete behavioral testing.  This continued for approximately 3 minutes.  During this event, the EEG continued to show normal PDR.  No epileptiform discharges or ictal activity was seen during this event.         IMPRESSION:  Normal video EEG.  No epileptiform discharges were present.  No electrographic seizures were recorded.  The patient had 2 events with decreased attentiveness and unresponsiveness with no abnormal EEG correlate suggestive of psychogenic nonepileptic spells.         YVON GREGORY MD             D: 2018   T: 2018   MT: CLIFFORD      Name:     JACKIE IVY   MRN:      [id number]        Account:        [id number]   :      1980           Procedure Date: 2018      Document: [id number]

## 2018-04-30 ENCOUNTER — ALLIED HEALTH/NURSE VISIT (OUTPATIENT)
Dept: NEUROLOGY | Facility: CLINIC | Age: 38
DRG: 101 | End: 2018-04-30
Attending: PSYCHIATRY & NEUROLOGY
Payer: COMMERCIAL

## 2018-04-30 ENCOUNTER — APPOINTMENT (OUTPATIENT)
Dept: MRI IMAGING | Facility: CLINIC | Age: 38
DRG: 101 | End: 2018-04-30
Attending: PSYCHIATRY & NEUROLOGY
Payer: COMMERCIAL

## 2018-04-30 DIAGNOSIS — G40.909 EPILEPSY (H): Primary | ICD-10-CM

## 2018-04-30 LAB
ANION GAP SERPL CALCULATED.3IONS-SCNC: 10 MMOL/L (ref 3–14)
APTT PPP: 24 SEC (ref 22–37)
BASE DEFICIT BLDV-SCNC: 4.2 MMOL/L
BUN SERPL-MCNC: 10 MG/DL (ref 7–30)
CA-I BLD-MCNC: 4.8 MG/DL (ref 4.4–5.2)
CALCIUM SERPL-MCNC: 8.3 MG/DL (ref 8.5–10.1)
CHLORIDE SERPL-SCNC: 110 MMOL/L (ref 94–109)
CO2 SERPL-SCNC: 22 MMOL/L (ref 20–32)
CREAT SERPL-MCNC: 0.67 MG/DL (ref 0.52–1.04)
ERYTHROCYTE [DISTWIDTH] IN BLOOD BY AUTOMATED COUNT: 12.6 % (ref 10–15)
GFR SERPL CREATININE-BSD FRML MDRD: >90 ML/MIN/1.7M2
GLUCOSE BLD-MCNC: 93 MG/DL (ref 70–99)
GLUCOSE SERPL-MCNC: 90 MG/DL (ref 70–99)
HCO3 BLDV-SCNC: 21 MMOL/L (ref 21–28)
HCT VFR BLD AUTO: 41.9 % (ref 35–47)
HGB BLD-MCNC: 13.9 G/DL (ref 11.7–15.7)
HGB BLD-MCNC: 14 G/DL (ref 11.7–15.7)
MAGNESIUM SERPL-MCNC: 1.9 MG/DL (ref 1.6–2.3)
MCH RBC QN AUTO: 32.7 PG (ref 26.5–33)
MCHC RBC AUTO-ENTMCNC: 33.2 G/DL (ref 31.5–36.5)
MCV RBC AUTO: 99 FL (ref 78–100)
MRSA DNA SPEC QL NAA+PROBE: NEGATIVE
O2/TOTAL GAS SETTING VFR VENT: ABNORMAL %
PCO2 BLDV: 40 MM HG (ref 40–50)
PH BLDV: 7.33 PH (ref 7.32–7.43)
PHOSPHATE SERPL-MCNC: 3.9 MG/DL (ref 2.5–4.5)
PLATELET # BLD AUTO: 251 10E9/L (ref 150–450)
PO2 BLDV: 165 MM HG (ref 25–47)
POTASSIUM BLD-SCNC: 3.5 MMOL/L (ref 3.4–5.3)
POTASSIUM SERPL-SCNC: 3.5 MMOL/L (ref 3.4–5.3)
RBC # BLD AUTO: 4.25 10E12/L (ref 3.8–5.2)
SODIUM BLD-SCNC: 142 MMOL/L (ref 133–144)
SODIUM SERPL-SCNC: 142 MMOL/L (ref 133–144)
SPECIMEN SOURCE: NORMAL
TROPONIN I SERPL-MCNC: <0.015 UG/L (ref 0–0.04)
WBC # BLD AUTO: 10.7 10E9/L (ref 4–11)

## 2018-04-30 PROCEDURE — 84295 ASSAY OF SERUM SODIUM: CPT

## 2018-04-30 PROCEDURE — 84132 ASSAY OF SERUM POTASSIUM: CPT

## 2018-04-30 PROCEDURE — 25000132 ZZH RX MED GY IP 250 OP 250 PS 637: Performed by: PSYCHIATRY & NEUROLOGY

## 2018-04-30 PROCEDURE — 25000128 H RX IP 250 OP 636: Performed by: PSYCHIATRY & NEUROLOGY

## 2018-04-30 PROCEDURE — A9585 GADOBUTROL INJECTION: HCPCS | Performed by: PSYCHIATRY & NEUROLOGY

## 2018-04-30 PROCEDURE — 25000128 H RX IP 250 OP 636: Performed by: STUDENT IN AN ORGANIZED HEALTH CARE EDUCATION/TRAINING PROGRAM

## 2018-04-30 PROCEDURE — 40000556 ZZH STATISTIC PERIPHERAL IV START W US GUIDANCE

## 2018-04-30 PROCEDURE — 82330 ASSAY OF CALCIUM: CPT

## 2018-04-30 PROCEDURE — 87640 STAPH A DNA AMP PROBE: CPT | Performed by: STUDENT IN AN ORGANIZED HEALTH CARE EDUCATION/TRAINING PROGRAM

## 2018-04-30 PROCEDURE — 85027 COMPLETE CBC AUTOMATED: CPT

## 2018-04-30 PROCEDURE — 87641 MR-STAPH DNA AMP PROBE: CPT | Performed by: STUDENT IN AN ORGANIZED HEALTH CARE EDUCATION/TRAINING PROGRAM

## 2018-04-30 PROCEDURE — 84484 ASSAY OF TROPONIN QUANT: CPT

## 2018-04-30 PROCEDURE — 84100 ASSAY OF PHOSPHORUS: CPT

## 2018-04-30 PROCEDURE — 95951 ZZHC EEG VIDEO EACH 24 HR: CPT | Mod: ZF

## 2018-04-30 PROCEDURE — 83735 ASSAY OF MAGNESIUM: CPT

## 2018-04-30 PROCEDURE — 82947 ASSAY GLUCOSE BLOOD QUANT: CPT

## 2018-04-30 PROCEDURE — 20000004 ZZH R&B ICU UMMC

## 2018-04-30 PROCEDURE — 85730 THROMBOPLASTIN TIME PARTIAL: CPT

## 2018-04-30 PROCEDURE — 82803 BLOOD GASES ANY COMBINATION: CPT

## 2018-04-30 PROCEDURE — 80048 BASIC METABOLIC PNL TOTAL CA: CPT

## 2018-04-30 PROCEDURE — 70553 MRI BRAIN STEM W/O & W/DYE: CPT

## 2018-04-30 RX ORDER — SODIUM CHLORIDE 9 MG/ML
INJECTION, SOLUTION INTRAVENOUS
Status: DISCONTINUED
Start: 2018-04-30 | End: 2018-05-01 | Stop reason: HOSPADM

## 2018-04-30 RX ORDER — LORAZEPAM 2 MG/ML
2 INJECTION INTRAMUSCULAR ONCE
Status: DISCONTINUED | OUTPATIENT
Start: 2018-04-30 | End: 2018-05-05 | Stop reason: CLARIF

## 2018-04-30 RX ORDER — GADOBUTROL 604.72 MG/ML
7.5 INJECTION INTRAVENOUS ONCE
Status: COMPLETED | OUTPATIENT
Start: 2018-04-30 | End: 2018-04-30

## 2018-04-30 RX ORDER — CLONAZEPAM 1 MG/1
1 TABLET ORAL 2 TIMES DAILY
Status: DISCONTINUED | OUTPATIENT
Start: 2018-04-30 | End: 2018-05-09 | Stop reason: HOSPADM

## 2018-04-30 RX ORDER — LORAZEPAM 2 MG/ML
6 INJECTION INTRAMUSCULAR ONCE
Status: DISCONTINUED | OUTPATIENT
Start: 2018-04-30 | End: 2018-05-05 | Stop reason: CLARIF

## 2018-04-30 RX ORDER — LORAZEPAM 2 MG/ML
INJECTION INTRAMUSCULAR
Status: DISCONTINUED
Start: 2018-04-30 | End: 2018-04-30 | Stop reason: WASHOUT

## 2018-04-30 RX ORDER — TOPIRAMATE 50 MG/1
150 TABLET, FILM COATED ORAL ONCE
Status: COMPLETED | OUTPATIENT
Start: 2018-04-30 | End: 2018-04-30

## 2018-04-30 RX ORDER — DIPHENHYDRAMINE HCL 25 MG
25 CAPSULE ORAL
Status: DISCONTINUED | OUTPATIENT
Start: 2018-04-30 | End: 2018-05-02 | Stop reason: CLARIF

## 2018-04-30 RX ADMIN — DIPHENHYDRAMINE HYDROCHLORIDE 25 MG: 25 CAPSULE ORAL at 21:17

## 2018-04-30 RX ADMIN — PRAZOSIN HYDROCHLORIDE 6 MG: 5 CAPSULE ORAL at 21:17

## 2018-04-30 RX ADMIN — DESVENLAFAXINE SUCCINATE 100 MG: 50 TABLET, EXTENDED RELEASE ORAL at 09:44

## 2018-04-30 RX ADMIN — PERAMPANEL 6 MG: 2 TABLET ORAL at 21:18

## 2018-04-30 RX ADMIN — CLONAZEPAM 1 MG: 1 TABLET ORAL at 16:48

## 2018-04-30 RX ADMIN — METHYLPHENIDATE HYDROCHLORIDE 36 MG: 36 TABLET ORAL at 09:42

## 2018-04-30 RX ADMIN — NICOTINE 1 PATCH: 21 PATCH TRANSDERMAL at 09:43

## 2018-04-30 RX ADMIN — TOPIRAMATE 150 MG: 50 TABLET ORAL at 12:38

## 2018-04-30 RX ADMIN — MIDAZOLAM 1 MG: 1 INJECTION INTRAMUSCULAR; INTRAVENOUS at 15:13

## 2018-04-30 RX ADMIN — LORAZEPAM 2 MG: 2 INJECTION INTRAMUSCULAR; INTRAVENOUS at 15:46

## 2018-04-30 RX ADMIN — BUSPIRONE HYDROCHLORIDE 22.5 MG: 15 TABLET ORAL at 09:43

## 2018-04-30 RX ADMIN — LORAZEPAM 2 MG: 2 INJECTION INTRAMUSCULAR; INTRAVENOUS at 15:43

## 2018-04-30 RX ADMIN — TRAZODONE HYDROCHLORIDE 150 MG: 50 TABLET ORAL at 21:18

## 2018-04-30 RX ADMIN — GADOBUTROL 6.5 ML: 604.72 INJECTION INTRAVENOUS at 14:31

## 2018-04-30 RX ADMIN — DIPHENHYDRAMINE HYDROCHLORIDE 25 MG: 25 CAPSULE ORAL at 18:28

## 2018-04-30 RX ADMIN — CLONAZEPAM 1.5 MG: 1 TABLET ORAL at 09:42

## 2018-04-30 RX ADMIN — BUTALBITAL, ACETAMINOPHEN AND CAFFEINE 1 TABLET: 50; 325; 40 TABLET ORAL at 18:28

## 2018-04-30 RX ADMIN — Medication 1500 MG: at 09:43

## 2018-04-30 ASSESSMENT — VISUAL ACUITY
OU: NORMAL ACUITY;GLASSES

## 2018-04-30 NOTE — PLAN OF CARE
Problem: Patient Care Overview  Goal: Plan of Care/Patient Progress Review  Outcome: No Change  VSS. A&Ox4. Neuros intact. Denies pain. No events witnessed or reported this shift. Leads intact and seizure precautions in place. Tolerating regular diet. PIV SL. Up SBA/GB. No issues with bowel or bladder. Pt currently down at MRI. Continue with POC.    Addendum- code blue called on patient during MRI. Epilepsy team aware. Pt transferred to , report given to  nurse.

## 2018-04-30 NOTE — PROGRESS NOTES
[facility], [place]   Epilepsy Service Daily Note      Interval History:   No further spells. No convulsive spells since admission. Patient's , Vargas was available by phone during rounding. He states with convulsive events she mostly has stiffening and not much shaking or jerking. Her head tilts to one side, he thinks the right. Makes gagging noises. Some have occurred out of sleep.     Review of System:   No nausea, No vomiting, no headaches, no dizziness, no chest pain.     Medications:   Antiepileptic Medications Home Doses: Fycompa 12 hs, topiramate 150-150, klonopin 1.5-1.5   Antiepileptic Medications Current Doses: Fycompa 6 hs, klonopin 1.5-1.5     Exam: Blood pressure 119/68, pulse 84, temperature 98.5  F (36.9  C), temperature source Oral, resp. rate 16, weight 65.3 kg (144 lb), SpO2 95 %.   General: NAD  Head: NC/AT  Neuro: Alert and oriented. Speech fluent. Flat affect. Hearing intact to normal conversation. EOM's intact. Face symmetric.   EEG: normal thus far   Assessment and Plan:   1. Jaja Patton is a 37 year old right handed female with a reported h/o Arnold- Chiari malformation, intractable epilepsy since age 32, s/p VNS placement who is being admitted for characterization and presurgical evaluation. Spells of staring and unresponsiveness had no EEG changes. Need to record convulsive seizures.     -continue vEEG monitoring  -continue off topiramate  -continue Fycompa 6 hs  -decrease klonopin 1-1  -brain MRI today during hygiene, will give topiramate 150 prior    -start benadryl 25 mg      Zluy Naik PA-C    Type of target event identified: staring spell with altered awareness, convulsion  Number of events: more needed, 5-need to record convulsions   Discharge medication plan: To be decided  Further Imaging studies needed prior to discharge: MRI  Discharge transportation: not discussed  Other pertinent issues/goals for discharge: none    Total time: 15  minute was spent in the care of this patient. The patient agrees with the above mentioned plan of care. I answered all the patient's questions and addressed immediate concerns. More than 50% of time spent consisted of counseling and coordinating care, including discussion of the diagnostic significance of EEG findings, anti-seizure medication management, and planning for discharge home

## 2018-04-30 NOTE — PLAN OF CARE
Problem: Patient Care Overview  Goal: Plan of Care/Patient Progress Review  Outcome: No Change  Pt. has h/o Arnold-Chiari malformation, s/p surgery x2, intractable epilepsy, s/p VNS replacement in 2015, admitted for seizure characterization. VEEG leads in place. No events reported or witnessed this shift.VSS. A&Ox4, flat affect. Neuros intact, denied baseline N/T on bottom of feet. PIV, SL. Tolerating regular diet. Voids spontaneously without difficulty. No BM overnight. Pt denied any dizziness/nausea. Denied any pain. Off topiramate since Friday, Fycompa decreased to 6 mg/hs. Continue to monitor and follow POC.

## 2018-04-30 NOTE — PROGRESS NOTES
"Review of outside information from [facility] shows cranioplasty implants as:-   [employer] Screws [phone], [phone], and Mesh [phone].  Mesh is described in ' notes a \"titanium mesh to support the cranioplasty\"    Discussed with Lucy in MRI  "

## 2018-04-30 NOTE — CODE/RAPID RESPONSE
Significant event note    Code blue was called around 2:30pm in MRI.  Patient was given MRI contrast and started having seizure like activity with unresponsiveness and flexion of bilateral UE, and plantar flexion of LE. Patient received 1 dose of epi and benandryl before my arrival.  Patient never lost a pulse, airway was maintained at first with bag valve mask (and then with a oral airway and a nasal airway), sats over 90% and BP was around 100s/60s.  Patient received 2mg IV lorazepam every 2 minutes x 3 and then midazolam 2mg x 2.  Patient continued to have seizure, at which point, order was made for fosphenytoin load.  While that was being prepared, propofol was given once 40mcg once which seemed to break the seizure.  She relaxed, still unresponsive, pupils midline, reactive, equal.  A few minutes later she went back to having seizure like activity which broke with 30 mcg of propofol.  At this point was transported to 4A ICU.  Patient woke up at this point, confused, pulled out her nasal airway.  She was given 1mg of midazolam for agitation.   She was maintaining her airway and satting well and intermittently following commands.    /67 (BP Location: Right arm)  Pulse 84  Temp 99.3  F (37.4  C) (Axillary)  Resp 16  Wt 65.3 kg (144 lb)  SpO2 100%  BMI 24.72 kg/m2     36 y/o F with hx of intractable seizure, on a tapering dose of anti-seizure meds, appears to have status epilepticus on MRI.  On exam doesn't have appearance of anaphylactic reaction to MRI contrast.  Breaking of seizure like activity with propofol consistent with seizure.  However, it appears that pseudo seizure is on the ddx as prior seizure like activity didn't have EEG changes.  Video EEG was initiated. Handoff was given to neuro critical care fellow.    Tanvi Zelaya DO, MS  Internal Medicine, PGY-2  Pager [phone]

## 2018-04-30 NOTE — CONSULTS
Neuroscience Intensive Care Consult    2018    HPI  Jaja Patton is a 37 year old year old woman with a history of Arnold-Chiari malformation, reported intractable epilepsy s/p VNS who was admitted on 2018 for seizure characterization and pre-surgical evaluation. Today there was a code blue called ~1430 with concern for seizure.  It was reported that the patient had bilateral UE flexion, unresponsiveness, and plantar flexion.  She received 1 dose of epi but never lost a pulse. Patient did not desaturate during the event.  She received ativan 2mg x3, versed 2mg x2, propofol 40mcg and 30mcg, and another 1mg of versed. She was then transferred to the Neuro ICU with concern for status epilepticus. On arrival to  she was noted to have UE stiffening with no evidence of clonic movement. Her head was turned to the left with her eyes closed.  She was making grunting movements during the event.    The patient has been weaning her AEDs during this admission for further characterization and presurgical evaluation. Please refer to the Epilepsy H&P dated  for further detail of the patient's types of seizures, previous trialed medications, risk factors, and pertinent investigations.    PMH  No past medical history on file.    ROS: 10 systems reviewed and negative except what is noted in the HPI    FH  No family history of seizure    Social History     Social History     Marital status:      Spouse name: N/A     Number of children: N/A     Years of education: N/A     Occupational History     Not on file.     Social History Main Topics     Smoking status: Current Every Day Smoker     Smokeless tobacco: Never Used     Alcohol use No     Drug use: Not on file     Sexual activity: Not on file     Other Topics Concern     Not on file     Social History Narrative     No narrative on file     24 Hour Vital Signs Summary:  Temperatures:  Current - Temp: 99.3  F (37.4  C); Max - Temp  Av.3  F (36.8  C)  Min:  97.1  F (36.2  C)  Max: 99.3  F (37.4  C)  Respiration range: Resp  Av  Min: 16  Max: 16  Pulse range: Pulse  Av  Min: 50  Max: 84  Blood pressure range: Systolic (24hrs), Av , Min:103 , Max:130   ; Diastolic (24hrs), Av, Min:60, Max:68    Pulse oximetry range: SpO2  Av.3 %  Min: 94 %  Max: 100 %    Ventilator Settings  Resp: 16    Intake/Output Summary (Last 24 hours) at 18 1530  Last data filed at 18 1830   Gross per 24 hour   Intake              300 ml   Output                0 ml   Net              300 ml       BP - Mean:  [81] 81    Current Medications:    busPIRone  22.5 mg Oral BID     calcium carbonate  1,500 mg Oral Daily     clonazePAM (klonoPIN) tablet 1 mg  1 mg Oral BID     desvenlafaxine succinate  100 mg Oral Daily     diphenhydrAMINE  25 mg Oral 4x Daily w/meals     fosphenytoin (CEREBYX) intermittent infusion (maintenance)  20 mg PE/kg Intravenous Once     methylphenidate ER  36 mg Oral Daily     nicotine  1 patch Transdermal Daily     nicotine   Transdermal Q8H     nicotine   Transdermal Daily     perampanel  6 mg Oral At Bedtime     prazosin  6 mg Oral At Bedtime     sodium chloride (PF)  3 mL Intracatheter Q8H     traZODone  150 mg Oral At Bedtime       PRN Medications:  acetaminophen, bacitracin, butalbital-acetaminophen-caffeine, docusate sodium, lidocaine 4%, lidocaine (viscous), lidocaine (buffered or not buffered), LORazepam, magnesium hydroxide, ranitidine, sodium chloride (PF)    Allergies   Allergen Reactions     Latex Anaphylaxis     Adhesive Tape Rash     Betadine Antiseptic Gauze [Povidone Iodine] Hives     Physical Examination:  /67 (BP Location: Right arm)  Pulse 84  Temp 99.3  F (37.4  C) (Axillary)  Resp 16  Wt 65.3 kg (144 lb)  SpO2 100%  BMI 24.72 kg/m2  General: Sitting upright in bed and in NAD  HEENT: EEG leads in place  Cardiac: RRR  Pulm: no respiratory distress  Abd: nondistended  Extremities: no edema  Skin: no rashs or  lesions    Neuro  MS: Alert, oriented to person, place, and time. No evidence of aphasia  CN: PERRL, EOMI, VFF, facial sensation intact, symmetric smile, SCM strong, no dysarthria  Motor: 5/5 strength throughout, no abnormal movements  Sensory: intact to light touch throughout  Reflexes: 2+ and symmtric, no meli, toes down going  Coordination: FNF without dysmetria    Labs/Studies:  Recent Labs   Lab Test  04/30/18   1500  04/23/18   1510   NA  142  140   POTASSIUM  3.5  4.0   CHLORIDE   --   110*   CO2   --   19*   ANIONGAP   --   11   GLC  93  76   BUN   --   8   CR   --   0.73   JOVANI   --   8.6   WBC  10.7  7.5   RBC  4.25  4.21   HGB  14.0  13.9  13.7   PLT  251  251       Recent Labs   Lab Test  04/30/18   1500   PTT  24     Imaging:    MRI brain (4/30/18): Slight asymmetric increased FLAIR signal along the medial left mesial temporal lobe without evidence of atrophy. No associated enhancement. This could be due to seizure related edema or mesial temporal sclerosis on the left. Several punctate foci of FLAIR hyperintensity in bilateral frontal bilateral subcortical white matter. These are nonspecific might be seen with frequent migraine headaches, sequela of prior infectious/inflammatory etiologies or early mild chronic microvascular ischemic changes.     Assessment/Plan  Jaja Patton is a 37 year old year old woman with a history of Arnold-Chiari malformation, reported intractable epilepsy s/p VNS who was admitted on 4/25/2018 for seizure characterization and pre-surgical evaluation.    Plan  Neuro:    #History of reported seizure disorder: patient diagnosed at age 32. It remains unclear if she has had an EEG that has shown seizure or epileptiform discharges. Workup completed at OSH. The patient received multiple sedating medications today for a spell during which her respiratory status remained stable. It is unclear whether or not this was an epileptic or non epileptic event as the patient was not on  EEG at the time for a hygiene break. Will continue to monitor  - Continue AED taper per epilepsy    Resp: No acute concerns. spO2 >92%    CV: No acute concerns. No history of heart disease.    Renal: No acute concerns. No history of CKD.    Endo: No acute concerns. No history of DM    Heme: H/H stable    GI: No acute concerns. Tolerating Regular diet    ID: No acute concerns    FEN: Regular diet  PPX:    DVT prophylaxis: SCDs    GI: Not indicated  Code Status: Full code    Dispo: Monitor in ICU overnight and hopefully transfer to  tomorrow if stable    Patient seen and discussed with the ICU fellow Dr. Sutherland. The staff is Dr. Xiao.    Harrison Topete MD  Neurology PGY2

## 2018-04-30 NOTE — PROGRESS NOTES
[employer]shop Vagus Nerve Stimulator interrogated and turned off prior to MRI study as per standard practice.   Settings as follows:-  Patient ID SC, Model , Serial # [id number], Implant date 2017-01-16.    NORMAL SETTINGS:  Output Current 0.0 mA,  Pulse/signal Frequency 30 Hz,  Pulse Width 750 ìsec,  On Time 30 sec,  Off Time 0.8 min,  IFI NO.    MAGNET SETTINGS:  Magnet Output Current 2.0mA, Set to 0.00mA  Magnet Pulse Width 750 ìsec,  Magnet On Time 60 sec.    AUTOSTIM SETTINGS:   AutoStim Current 0.0  AutoStim Pulse Width 500  AutoStim On Time 60    CONFIGURATION SETTINGS:  Tachycardia Detection OFF

## 2018-04-30 NOTE — PLAN OF CARE
Problem: Patient Care Overview  Goal: Plan of Care/Patient Progress Review  Outcome: No Change  Pt here for vEEG. Leads intact. No events reported or monitored. VSS. A&Ox4. Bottom feet N/T baseline. AO Neuros intact. No BM this shift. Voiding w/out difficulty. PIV-SL. SBA w/gb. Ambulated x1 in hallways. Sleep deprivation til 2200 today. BA on. Will continue to monitor and follow plan of care.

## 2018-04-30 NOTE — MR AVS SNAPSHOT
After Visit Summary   4/30/2018    Jaja Patton    MRN: [id number]           Patient Information     Date Of Birth          1980        Visit Information        Provider Department      4/30/2018 7:00 AM [facility] EEG TECH 4 [facility] EEG        Today's Diagnoses     Epilepsy (H)    -  1       Follow-ups after your visit        Your next 10 appointments already scheduled     Apr 30, 2018  2:00 PM CDT   MR BRAIN W/O & W CONTRAST with UUMR1   [facility], [place], [facility] ([facility], [facility])    [address] [address] [phone]           Take your medicines as usual, unless your doctor tells you not to. Bring a list of your current medicines to your exam (including vitamins, minerals and over-the-counter drugs).  You may or may not receive intravenous (IV) contrast for this exam pending the discretion of the Radiologist.  You do not need to do anything special to prepare.  The MRI machine uses a strong magnet. Please wear clothes without metal (snaps, zippers). A sweatsuit works well, or we may give you a hospital gown.  Please remove any body piercings and hair extensions before you arrive. You will also remove watches, jewelry, hairpins, wallets, dentures, partial dental plates and hearing aids. You may wear contact lenses, and you may be able to wear your rings. We have a safe place to keep your personal items, but it is safer to leave them at home.  **IMPORTANT** THE INSTRUCTIONS BELOW ARE ONLY FOR THOSE PATIENTS WHO HAVE BEEN PRESCRIBED SEDATION OR GENERAL ANESTHESIA DURING THEIR MRI PROCEDURE:  IF YOUR DOCTOR PRESCRIBED ORAL SEDATION (take medicine to help you relax during your exam):   You must get the medicine from your doctor (oral medication) before you arrive. Bring the medicine to the exam. Do not take it at home. You ll be told when to take it upon arriving for your exam.   Arrive one hour early. Bring someone who can take you home  after the test. Your medicine will make you sleepy. After the exam, you may not drive, take a bus or take a taxi by yourself.  IF YOUR DOCTOR PRESCRIBED IV SEDATION:   Arrive one hour early. Bring someone who can take you home after the test. Your medicine will make you sleepy. After the exam, you may not drive, take a bus or take a taxi by yourself.   No eating 6 hours before your exam. You may have clear liquids up until 4 hours before your exam. (Clear liquids include water, clear tea, black coffee and fruit juice without pulp.)  IF YOUR DOCTOR PRESCRIBED ANESTHESIA (be asleep for your exam):   Arrive 1 1/2 hours early. Bring someone who can take you home after the test. You may not drive, take a bus or take a taxi by yourself.   No eating 8 hours before your exam. You may have clear liquids up until 4 hours before your exam. (Clear liquids include water, clear tea, black coffee and fruit juice without pulp.)   You will spend four to five hours in the recovery room.  Please call the Imaging Department at your exam site with any questions.              Who to contact     Please call your clinic at [phone] to:    Ask questions about your health    Make or cancel appointments    Discuss your medicines    Learn about your test results    Speak to your doctor            Additional Information About Your Visit        MyChart Information     Tomorrowish is an electronic gateway that provides easy, online access to your medical records. With Tomorrowish, you can request a clinic appointment, read your test results, renew a prescription or communicate with your care team.     To sign up for [employer] visit the website at [email or web address]   You will be asked to enter the access code listed below, as well as some personal information. Please follow the directions to create your username and password.     Your access code is: [email or web address]  Expires: 2018  2:48 PM     Your access code will  in 90 days. If you  need help or a new code, please contact your [facility] or call [phone] for assistance.        Care EveryWhere ID     This is your Care EveryWhere ID. This could be used by other organizations to access your [place] medical records  [id number]         Blood Pressure from Last 3 Encounters:   04/30/18 119/68   04/23/18 123/82    Weight from Last 3 Encounters:   04/25/18 65.3 kg (144 lb)   04/23/18 66.5 kg (146 lb 9.6 oz)              Today, you had the following     No orders found for display         Today's Medication Changes      Notice     This visit is during an admission. Changes to the med list made in this visit will be reflected in the After Visit Summary of the admission.             Primary Care Provider Fax #    Physician No Ref-Primary [phone]       No address on file        Equal Access to Services     JUAN CONNOR : Zaria Staley, yany egan, ashley piña, jaquan whitfield . So [facility] [phone].    ATENCIÓN: Si habla español, tiene a waters disposición servicios gratuitos de asistencia lingüística. [facility] [phone].    We comply with applicable federal civil rights laws and [place] laws. We do not discriminate on the basis of race, color, national origin, age, disability, sex, sexual orientation, or gender identity.            Thank you!     Thank you for choosing [facility]  for your care. Our goal is always to provide you with excellent care. Hearing back from our patients is one way we can continue to improve our services. Please take a few minutes to complete the written survey that you may receive in the mail after your visit with us. Thank you!             Your Updated Medication List - Protect others around you: Learn how to safely use, store and throw away your medicines at www.disposemymeds.org.      Notice     This visit is during an admission. Changes to the med list made in this visit will  be reflected in the After Visit Summary of the admission.

## 2018-04-30 NOTE — PROGRESS NOTES
EEG CLINICAL NEUROPHYSIOLOGY PRELIMINARY REPORT    EEG following resumption of recording this afternoon reviewed. Normal 10 hz posterior dominant rhythm. Surprisingly, no focal or diffuse slowing. No epileptiform activity or ongoing seizures. Essentially normal study. Will continue viddeo-EEG monitoring. Would not administer fosphenytoin but continue recording on lower doses of fycompa and off topiramate to record target spell.    Miguel Gregory MD  Pager [phone]

## 2018-05-01 ENCOUNTER — ALLIED HEALTH/NURSE VISIT (OUTPATIENT)
Dept: NEUROLOGY | Facility: CLINIC | Age: 38
DRG: 101 | End: 2018-05-01
Attending: PSYCHIATRY & NEUROLOGY
Payer: COMMERCIAL

## 2018-05-01 DIAGNOSIS — R56.9 CONVULSIONS, UNSPECIFIED CONVULSION TYPE (H): Primary | ICD-10-CM

## 2018-05-01 PROCEDURE — 25000132 ZZH RX MED GY IP 250 OP 250 PS 637: Performed by: PSYCHIATRY & NEUROLOGY

## 2018-05-01 PROCEDURE — 95951 ZZHC EEG VIDEO EACH 24 HR: CPT | Mod: ZF

## 2018-05-01 PROCEDURE — 12000008 ZZH R&B INTERMEDIATE UMMC

## 2018-05-01 RX ADMIN — METHYLPHENIDATE HYDROCHLORIDE 36 MG: 36 TABLET ORAL at 08:58

## 2018-05-01 RX ADMIN — PRAZOSIN HYDROCHLORIDE 6 MG: 5 CAPSULE ORAL at 21:05

## 2018-05-01 RX ADMIN — DIPHENHYDRAMINE HYDROCHLORIDE 25 MG: 25 CAPSULE ORAL at 08:58

## 2018-05-01 RX ADMIN — CLONAZEPAM 1 MG: 1 TABLET ORAL at 17:04

## 2018-05-01 RX ADMIN — ACETAMINOPHEN 325 MG: 325 TABLET, FILM COATED ORAL at 17:04

## 2018-05-01 RX ADMIN — ACETAMINOPHEN 650 MG: 325 TABLET, FILM COATED ORAL at 10:02

## 2018-05-01 RX ADMIN — BUSPIRONE HYDROCHLORIDE 22.5 MG: 15 TABLET ORAL at 08:59

## 2018-05-01 RX ADMIN — NICOTINE 1 PATCH: 21 PATCH TRANSDERMAL at 10:03

## 2018-05-01 RX ADMIN — DIPHENHYDRAMINE HYDROCHLORIDE 25 MG: 25 CAPSULE ORAL at 21:05

## 2018-05-01 RX ADMIN — BUTALBITAL, ACETAMINOPHEN AND CAFFEINE 1 TABLET: 50; 325; 40 TABLET ORAL at 17:05

## 2018-05-01 RX ADMIN — DIPHENHYDRAMINE HYDROCHLORIDE 25 MG: 25 CAPSULE ORAL at 12:14

## 2018-05-01 RX ADMIN — TRAZODONE HYDROCHLORIDE 150 MG: 50 TABLET ORAL at 21:05

## 2018-05-01 RX ADMIN — Medication 1500 MG: at 08:59

## 2018-05-01 RX ADMIN — DIPHENHYDRAMINE HYDROCHLORIDE 25 MG: 25 CAPSULE ORAL at 17:05

## 2018-05-01 RX ADMIN — DESVENLAFAXINE SUCCINATE 100 MG: 50 TABLET, EXTENDED RELEASE ORAL at 10:02

## 2018-05-01 RX ADMIN — CLONAZEPAM 1 MG: 1 TABLET ORAL at 08:58

## 2018-05-01 ASSESSMENT — PAIN DESCRIPTION - DESCRIPTORS
DESCRIPTORS: ACHING

## 2018-05-01 ASSESSMENT — VISUAL ACUITY
OU: NORMAL ACUITY;GLASSES

## 2018-05-01 NOTE — PROCEDURES
Procedure Date: 2018      EEG #:  -5.      DATE OF STUDY:  2018.      TYPE OF STUDY:  Inpatient video EEG monitoring.        DURATION OF RECORDIN hours 28 minutes 19 seconds.      HISTORY:  Day 5 of video EEG monitoring, Jackie Patton, a 37-year-old being evaluated for what has been diagnosed as medically intractable partial epilepsy.  The patient reports spells of staring and unresponsiveness.  She reports spells of stiffening with eye closure followed by tongue bite and some trauma.  She has been refractory to multiple medications and consideration is being given to more aggressive treatment interventions.      TECHNICAL SUMMARY:  EEG is recorded from scalp electrodes placed according to the 10-20 International System.  Additional electrodes were utilized for referencing, artifact detection, and recording from other cerebral regions.  Video was continuously recorded.  Video was reviewed for clinical correlation and to assist with EEG interpretation.      FINDINGS:  During quiet wakefulness, 10 Hz posterior dominant rhythm, symmetrical, reactive.  During sleep, vertex waves, sleep spindles, positive occipital sharp transients of sleep.  Some alpha persistence noted.  Hyperventilation and photic stimulation were not performed.      INTERICTAL ABNORMALITIES:  No epileptiform discharges.      ICTAL ABNORMALITIES:  No electrographic seizures.      IMPRESSION:  Normal.  No epileptiform activity or seizures.         HYACINTH STRONG MD             D: 2018   T: 2018   MT: CLIFFORD      Name:     JACKIE PATTON   MRN:      [id number]        Account:        [id number]   :      1980           Procedure Date: 2018      Document: [id number]

## 2018-05-01 NOTE — PROGRESS NOTES
[facility], [place]   Epilepsy Service Daily Note      Interval History:   Code blue called while down for MRI yesterday afternoon, unfortunately EEG wires were removed at the time. Notes report she had bilateral UE flexion, unresponsiveness, and plantar flexion. She received ativan 2mg x3, versed 2mg x2 and propofol 40mcg and 30mcg. She maintained her airway and did not require intubation. She was brought to Neuro ICU for monitoring overnight. Once she was in ICU, EEG leads were placed and per Dr. Gregory there was no focal or diffuse slowing. Today she reports she is sore and has a headache. Plan is for her to be transferred back to  today.     Review of System:   Headache and soreness. No nausea, No vomiting, no dizziness, no chest pain.     Medications:   Antiepileptic Medications Home Doses: Fycompa 12 hs, topiramate 150-150, klonopin 1.5-1.5   Antiepileptic Medications Current Doses: Fycompa 6 hs, klonopin 1-1    Exam: Blood pressure 95/81, pulse 84, temperature 98.1  F (36.7  C), temperature source Axillary, resp. rate 21, weight 68.7 kg (151 lb 7.3 oz), SpO2 97 %.   General: NAD  Head: NC/AT  Respiratory: lungs CTA bilaterally  Cardiac: RRR, no murmur  Neuro: Alert and oriented. Speech fluent. Flat affect. Hearing intact to normal conversation. Pupils equal, round and reactive to light. EOM's intact. Face symmetric, tongue midline. No lesions noted. Strength full bilaterally. No drift or pronation. Intact FNF. Sensation grossly intact to light touch.   EEG: normal thus far   Assessment and Plan:   1. Jaja Patton is a 37 year old right handed female with a reported h/o Arnold- Chiari malformation, intractable epilepsy since age 32, s/p VNS placement who is being admitted for characterization and presurgical evaluation. Spells of staring and unresponsiveness had no EEG changes. Need to record convulsive seizures.     -continue vEEG monitoring  -transfer back to   -continue  off topiramate  -hold Fycompa tonight  -continue klonopin 1-1  -continue benadryl 25 mg QID to help induce seizures       Zuly Naik PA-C    Type of target event identified: staring spell with altered awareness, convulsion  Number of events: more needed, 5-need to record convulsions   Discharge medication plan: To be decided  Further Imaging studies needed prior to discharge: no  Discharge transportation: not discussed  Other pertinent issues/goals for discharge: none    Total time: 20 minute was spent in the care of this patient. The patient agrees with the above mentioned plan of care. I answered all the patient's questions and addressed immediate concerns. More than 50% of time spent consisted of counseling and coordinating care, including discussion of the diagnostic significance of EEG findings, anti-seizure medication management, and planning for discharge home

## 2018-05-01 NOTE — PLAN OF CARE
Problem: Patient Care Overview  Goal: Plan of Care/Patient Progress Review  Outcome: No Change  Neuro- Q 2 hr neuro checks being complete. Pt A&O x4, mumbles words, follows commands, and uses call light appropriately. PERRL. See flowsheets for further documentation. Pt given scheduled PM doses of benadryl and trazadone at 2130. Writer back in room at 2300 to assess pt. Pt very hard to arouse, requires repeated stimulation, pt still able to follow commands but too sleepy to take PM dose of Buspar. MD notified that pt more lethargic from meds. Pt also too sleepy to participate to assess for pronator drift and swallow/ gag cranial nerve. EEG in place. No events witnessed this shift. Seizure pads applied. Afebrile.   CV- HR 80-90's when awake, 60's when sleeping with no ectopy. BPs soft but stable.   Pulm- Lung sounds clear, on RA at 96% SpO2  GI- regular diet in place, ate 100% of dinner, takes meds PO without difficulty. No BM during shift. Commode ordered   - using bedpan, see flowsheets for output  Skin- no concerns, 1 PIV's saline locked and working well.    Continue to monitor for seizure like activity and notify MD of changes

## 2018-05-01 NOTE — PLAN OF CARE
Problem: Patient Care Overview  Goal: Plan of Care/Patient Progress Review  Outcome: Improving  D/I/A: Patient ready for transfer to  per primary ICU team. Neuro status intact and unchanged - voice is soft, patient mumbles at times; slight hesitation noted w/ nose touch neuro assessment; occasional jerky eye movements when tracking objects. No seizures reported or witnessed. VEEG remains active. HR NSR. BP soft/stable. Lungs clear on RA. Voiding large amounts; taking large volume PO fluids. Tolerating regular diet. Up to commode w/ SBA. Patient has misplaced or lost the key to a lock for her suitcase and a silicone gauged plug for her ear, room was searched upon transfer to , items were not found. Report provided to 6A RN, patient transported to new room.  P: Continue to closely monitor. Transfer cares to

## 2018-05-01 NOTE — MR AVS SNAPSHOT
After Visit Summary   2018    Jaja Patton    MRN: [id number]           Patient Information     Date Of Birth          1980        Visit Information        Provider Department      2018 7:00 AM [facility] EEG TECH 4 [facility] EEG        Today's Diagnoses     Convulsions, unspecified convulsion type (H)    -  1       Follow-ups after your visit        Who to contact     Please call your clinic at [phone] to:    Ask questions about your health    Make or cancel appointments    Discuss your medicines    Learn about your test results    Speak to your doctor            Additional Information About Your Visit        MyChart Information     Chaordix is an electronic gateway that provides easy, online access to your medical records. With Chaordix, you can request a clinic appointment, read your test results, renew a prescription or communicate with your care team.     To sign up for HOTEL Top-Level Domaint visit the website at [email or web address]   You will be asked to enter the access code listed below, as well as some personal information. Please follow the directions to create your username and password.     Your access code is: [email or web address]  Expires: 2018  2:48 PM     Your access code will  in 90 days. If you need help or a new code, please contact your [facility] or call [phone] for assistance.        Care EveryWhere ID     This is your Care EveryWhere ID. This could be used by other organizations to access your [place] medical records  [id number]         Blood Pressure from Last 3 Encounters:   18 94/56   18 123/82    Weight from Last 3 Encounters:   18 68.7 kg (151 lb 7.3 oz)   18 66.5 kg (146 lb 9.6 oz)              Today, you had the following     No orders found for display         Today's Medication Changes      Notice     This visit is during an admission. Changes to the med list made in this visit will be reflected in the After  Visit Summary of the admission.             Primary Care Provider Fax #    Physician No Ref-Primary [phone]       No address on file        Equal Access to Services     JUAN CONNOR : Zaria aad ku hadtoniaidalia Rebeka, yany gillesralf, ashley piña, jaquan nascimento. So [facility] [phone].    ATENCIÓN: Si habla español, tiene a waters disposición servicios gratuitos de asistencia lingüística. Llame al [phone].    We comply with applicable federal civil rights laws and [place] laws. We do not discriminate on the basis of race, color, national origin, age, disability, sex, sexual orientation, or gender identity.            Thank you!     Thank you for choosing [facility]  for your care. Our goal is always to provide you with excellent care. Hearing back from our patients is one way we can continue to improve our services. Please take a few minutes to complete the written survey that you may receive in the mail after your visit with us. Thank you!             Your Updated Medication List - Protect others around you: Learn how to safely use, store and throw away your medicines at www.disposemymeds.org.      Notice     This visit is during an admission. Changes to the med list made in this visit will be reflected in the After Visit Summary of the admission.

## 2018-05-01 NOTE — SIGNIFICANT EVENT
At 1539 pt started with tonic seizure activity of bilateral UE flexed and inward and head to the left with tonic activity. Pt's VSS remained stable, airway maintained, 2 mg Ativan x3 doses given IV. 1546 seizure stopped. cEEG being placed at the time, unable to record activity. Neuro critical care team at bedside, Dr. Gregory at bedside approx 1548. Postictal state of mild confusion and tearful. RN explained what happened in MRI, course of action, medications, and rationale for ICU opposed to 6A for more frequent continuous cardiac and pulse ox monitoring. Pt's  Vargas and sister notified via telephone, questions answered. Within one hour postictal pt oriented x4, able to call events prior to MRI, MANUEL, follow commands, AMBER.

## 2018-05-01 NOTE — SIGNIFICANT EVENT
6A brought pt's belongings down in luggage. RN went through belongings with pt at bedside and within visibility. Unopened bottle of wine noted, pt told this controlled substance is against hospital policy. Security and house supervisor notified and explained hospital policy to patient. Pt agreeable to have security take unopened bottle of wine and keep in their possession until pt discharge. Pt labels placed on unopened bottle of wine.

## 2018-05-01 NOTE — PLAN OF CARE
Problem: Patient Care Overview  Goal: Plan of Care/Patient Progress Review  Outcome: No Change  Pt tx to 6A. Tolerated well. neuros unchanged.

## 2018-05-02 ENCOUNTER — ALLIED HEALTH/NURSE VISIT (OUTPATIENT)
Dept: NEUROLOGY | Facility: CLINIC | Age: 38
DRG: 101 | End: 2018-05-02
Attending: PSYCHIATRY & NEUROLOGY
Payer: COMMERCIAL

## 2018-05-02 DIAGNOSIS — G40.909 EPILEPSY (H): Primary | ICD-10-CM

## 2018-05-02 PROCEDURE — 25000132 ZZH RX MED GY IP 250 OP 250 PS 637: Performed by: PSYCHIATRY & NEUROLOGY

## 2018-05-02 PROCEDURE — 95951 ZZHC EEG VIDEO EACH 24 HR: CPT | Mod: ZF

## 2018-05-02 PROCEDURE — 12000008 ZZH R&B INTERMEDIATE UMMC

## 2018-05-02 RX ADMIN — DIPHENHYDRAMINE HYDROCHLORIDE 25 MG: 25 CAPSULE ORAL at 09:33

## 2018-05-02 RX ADMIN — ACETAMINOPHEN 650 MG: 325 TABLET, FILM COATED ORAL at 03:49

## 2018-05-02 RX ADMIN — DESVENLAFAXINE SUCCINATE 100 MG: 50 TABLET, EXTENDED RELEASE ORAL at 09:38

## 2018-05-02 RX ADMIN — NICOTINE 1 PATCH: 21 PATCH TRANSDERMAL at 09:34

## 2018-05-02 RX ADMIN — Medication 1500 MG: at 09:33

## 2018-05-02 RX ADMIN — PRAZOSIN HYDROCHLORIDE 6 MG: 5 CAPSULE ORAL at 21:46

## 2018-05-02 RX ADMIN — BUSPIRONE HYDROCHLORIDE 22.5 MG: 15 TABLET ORAL at 20:12

## 2018-05-02 RX ADMIN — PERAMPANEL 6 MG: 2 TABLET ORAL at 21:47

## 2018-05-02 RX ADMIN — CLONAZEPAM 1 MG: 1 TABLET ORAL at 16:01

## 2018-05-02 RX ADMIN — CLONAZEPAM 1 MG: 1 TABLET ORAL at 09:32

## 2018-05-02 RX ADMIN — BUSPIRONE HYDROCHLORIDE 22.5 MG: 15 TABLET ORAL at 09:33

## 2018-05-02 RX ADMIN — TRAZODONE HYDROCHLORIDE 150 MG: 50 TABLET ORAL at 21:46

## 2018-05-02 RX ADMIN — METHYLPHENIDATE HYDROCHLORIDE 36 MG: 36 TABLET ORAL at 09:32

## 2018-05-02 ASSESSMENT — VISUAL ACUITY
OU: NORMAL ACUITY;GLASSES
OU: NORMAL ACUITY

## 2018-05-02 NOTE — PLAN OF CARE
Problem: Patient Care Overview  Goal: Plan of Care/Patient Progress Review  Outcome: No Change  VEEG monitoring, no events reported or witnessed this shift. VSS on RA. A&Ox4. Neuros unchanged: voice is soft, mumbles at times; occasional jerky eye movements when tracking objects. Administered PRN Tylenol once for c/o of HA. Voiding spont. No BM this shift. PIV SL'd. Up w/ SBA. Reg diet. Cont to monitor and with POC.    **Patient has misplaced or lost the key to a lock for her suitcase and a silicone gauged plug for her ear.

## 2018-05-02 NOTE — PROCEDURES
Procedure Date: 04/30/2018      EEG #:  -6      DATE OF RECORDING/SERVICE DATE:  04/30/2018      TYPE OF STUDY:  Inpatient video EEG monitoring.      DURATION OF STUDY:  20 hours, 46 minutes, 56 seconds.      HISTORY:  Day 2 of video-EEG monitoring on patient, Jaja Patton, a 37-year-old undergoing further evaluation of what are thought to be medically intractable partial seizures.  She reports periods of staring, unresponsiveness.  She reports periods of stiffening convulsing and jerking.  She reports tongue bite, urinary incontinence and multiple traumas associated with her seizures.        MEDICATIONS:  She presented on Fycompa 12 mg and Topamax 300 mg per day.   Topamax has been discontinued and Fycompa reduced to 6 mg per day to facilitate recording of seizures.     TECHNICAL SUMMARY: This continuous video- EEG monitoring procedure was performed with 23 scalp electrodes in 10-20 electrode system placements, and additional scalp, precordial and other surface electrodes used for electrical referencing and artifact detection.  Video monitoring was utilized and periodically reviewed by EEG technologists and the physician for electroclinical correlations.    FINDINGS:  During quiet wakefulness, 10 Hz posterior dominant rhythm, symmetrical, reactive.  During sleep, vertex waves, sleep spindles, some degree of sleep persistence, some degree of alpha persistence.  Hyperventilation/photic stimulation not performed.      INTERICTAL ABNORMALITIES:  No epileptiform discharges.      ICTAL ABNORMALITIES:  Electrodes were removed for an MRI brain scan.  During MRI brain scan, staff reported that patient had a spell of stiffening, amnesia and some tremoring that was thought to be a seizure.  Code was called.  She was transferred to ICU.  Electrodes were reattached in ICU approximately 1-1/2 hours following the spell.  She has been treated with lorazepam a total of 8 mg, propofol 40 and midazolam 2 following the spell.   EEG when started showed a 10 Hz posterior dominant rhythm.  There actually was very little in the way of diffuse or focal slowing.  There was no epileptiform activity.  EEG continued normal for the remainder of the study.      IMPRESSION:    Within normal limits.  No epileptiform activity or seizures were recorded while EEG recording was ongoing.       The patient experienced a major motor spell while electrodes were off in MRI; reportedly after patient had finished undergoing neuroimaging. Electrodes were reattached and recording resumed about 1-1/2 hours following the spell.  The patient had received high doses of benzodiazepines and some propofol in the interim.  Nonetheless, EEG, when resumed was entirely normal with no focal or diffuse slowing and no epileptiform activity.      Recording does not show evidence of significant postictal effect.  This does not necessarily exclude the possibility that the patient had a seizure when electrodes were off and no EEG recording was available.      HYACINTH STRONG MD             D: 2018   T: 2018   MT: CLIFFORD      Name:     JACKIE IVY   MRN:      [id number]        Account:        [id number]   :      1980           Procedure Date: 2018      Document: [id number]

## 2018-05-02 NOTE — MR AVS SNAPSHOT
After Visit Summary   2018    Jaja Patton    MRN: [id number]           Patient Information     Date Of Birth          1980        Visit Information        Provider Department      2018 7:00 AM [facility] EEG TECH 4 [facility] EEG        Today's Diagnoses     Epilepsy (H)    -  1       Follow-ups after your visit        Who to contact     Please call your clinic at [phone] to:    Ask questions about your health    Make or cancel appointments    Discuss your medicines    Learn about your test results    Speak to your doctor            Additional Information About Your Visit        MyChart Information     Neurodyn is an electronic gateway that provides easy, online access to your medical records. With Neurodyn, you can request a clinic appointment, read your test results, renew a prescription or communicate with your care team.     To sign up for Neurodyn visit the website at [email or web address]   You will be asked to enter the access code listed below, as well as some personal information. Please follow the directions to create your username and password.     Your access code is: [email or web address]  Expires: 2018  2:48 PM     Your access code will  in 90 days. If you need help or a new code, please contact your [facility] or call [phone] for assistance.        Care EveryWhere ID     This is your Care EveryWhere ID. This could be used by other organizations to access your [place] medical records  [id number]         Blood Pressure from Last 3 Encounters:   18 99/44   18 123/82    Weight from Last 3 Encounters:   18 68.7 kg (151 lb 7.3 oz)   18 66.5 kg (146 lb 9.6 oz)              Today, you had the following     No orders found for display         Today's Medication Changes      Notice     This visit is during an admission. Changes to the med list made in this visit will be reflected in the After Visit Summary of the admission.              Primary Care Provider Fax #    Physician No Ref-Primary [phone]       No address on file        Equal Access to Services     JUAN CONNOR : Hadii aad ku hadtoniaidalia Rebeka, yany gillesjazha, ashley piña, jaquan joaquinines stafford laalejandraangel nascimento. So [facility] [phone].    ATENCIÓN: Si habla español, tiene a waters disposición servicios gratuitos de asistencia lingüística. Llame al [phone].    We comply with applicable federal civil rights laws and [place] laws. We do not discriminate on the basis of race, color, national origin, age, disability, sex, sexual orientation, or gender identity.            Thank you!     Thank you for choosing [facility]  for your care. Our goal is always to provide you with excellent care. Hearing back from our patients is one way we can continue to improve our services. Please take a few minutes to complete the written survey that you may receive in the mail after your visit with us. Thank you!             Your Updated Medication List - Protect others around you: Learn how to safely use, store and throw away your medicines at www.disposemymeds.org.      Notice     This visit is during an admission. Changes to the med list made in this visit will be reflected in the After Visit Summary of the admission.

## 2018-05-02 NOTE — PLAN OF CARE
Problem: Patient Care Overview  Goal: Plan of Care/Patient Progress Review  Outcome: No Change  /69  Pulse 84  Temp 98.1  F (36.7  C) (Axillary)  Resp 18  Wt 68.7 kg (151 lb 7.3 oz)  SpO2 97%  BMI 26 kg/m2  VSS. A&Ox4. Neuros unchanged -voice is soft, patient mumbles at times; slight hesitation noted w/ nose touch neuro assessment; occasional jerky eye movements when tracking objects. No seizures reported or witnessed. VEEG remains active. Voiding well. Regular diet. Up SBA.    **Patient has misplaced or lost the key to a lock for her suitcase and a silicone gauged plug for her ear  P: Continue to closely monitor.

## 2018-05-02 NOTE — PROGRESS NOTES
CLINICAL NUTRITION SERVICES - BRIEF NOTE    Reviewed nutrition risk factors due to LOS. Pt is tolerating a Regular diet, eating well per nursing documentation; eating 100% most recently. No nutrition issues identified at this time. RD will sign off at this time, unless consulted.     Deni Fu RD, LD, [facility]  Neuro ICU  Pager: [phone]

## 2018-05-02 NOTE — PROGRESS NOTES
[facility], [place]   Epilepsy Service Daily Note      Interval History:   No spells yesterday or overnight. Fycompa was held last night. Feels like she is getting a cold.     Review of System:   Nasal congestion. No nausea, No vomiting, no dizziness, no chest pain.     Medications:   Antiepileptic Medications Home Doses: Fycompa 12 hs, topiramate 150-150, klonopin 1.5-1.5   Antiepileptic Medications Current Doses: Fycompa 6 hs (held last night 5/1), klonopin 1-1    Exam: Blood pressure 99/44, pulse 60, temperature 96.9  F (36.1  C), temperature source Oral, resp. rate 16, weight 68.7 kg (151 lb 7.3 oz), SpO2 98 %.   General: NAD  Head: NC/AT  Neuro: Alert and oriented. Speech fluent. Flat affect. Tearful. Hearing intact to normal conversation. Pupils equal, round and reactive to light. EOM's intact. Face symmetric, tongue midline. No distal weakness. No drift or pronation. Intact FNF.  EEG: normal thus far   Assessment and Plan:   1. Jaja Patton is a 37 year old right handed female with a reported h/o Arnold- Chiari malformation, intractable epilepsy since age 32, s/p VNS placement who is being admitted for characterization and presurgical evaluation. Spells of staring and unresponsiveness had no EEG changes. Need to record convulsive seizures.     -continue vEEG monitoring  -stop janelle Naik PA-C    Type of target event identified: staring spell with altered awareness, convulsion  Number of events: more needed, 5-need to record convulsions   Discharge medication plan: To be decided  Further Imaging studies needed prior to discharge: no  Discharge transportation: not discussed  Other pertinent issues/goals for discharge: none    Total time: 15 minute was spent in the care of this patient. The patient agrees with the above mentioned plan of care. I answered all the patient's questions and addressed immediate concerns. More than 50% of time spent consisted of  counseling and coordinating care, including discussion of the diagnostic significance of EEG findings, anti-seizure medication management, and planning for discharge home

## 2018-05-03 ENCOUNTER — ALLIED HEALTH/NURSE VISIT (OUTPATIENT)
Dept: NEUROLOGY | Facility: CLINIC | Age: 38
DRG: 101 | End: 2018-05-03
Attending: PSYCHIATRY & NEUROLOGY
Payer: COMMERCIAL

## 2018-05-03 DIAGNOSIS — G40.909 EPILEPSY (H): Primary | ICD-10-CM

## 2018-05-03 PROCEDURE — 12000001 ZZH R&B MED SURG/OB UMMC

## 2018-05-03 PROCEDURE — 40000257 ZZH STATISTIC CONSULT NO CHARGE VASC ACCESS

## 2018-05-03 PROCEDURE — 36569 INSJ PICC 5 YR+ W/O IMAGING: CPT

## 2018-05-03 PROCEDURE — 95951 ZZHC EEG VIDEO EACH 24 HR: CPT | Mod: ZF

## 2018-05-03 PROCEDURE — 25000132 ZZH RX MED GY IP 250 OP 250 PS 637: Performed by: PSYCHIATRY & NEUROLOGY

## 2018-05-03 RX ADMIN — NICOTINE 1 PATCH: 21 PATCH TRANSDERMAL at 07:49

## 2018-05-03 RX ADMIN — BUSPIRONE HYDROCHLORIDE 22.5 MG: 15 TABLET ORAL at 07:50

## 2018-05-03 RX ADMIN — Medication 1500 MG: at 07:49

## 2018-05-03 RX ADMIN — DESVENLAFAXINE SUCCINATE 100 MG: 50 TABLET, EXTENDED RELEASE ORAL at 07:49

## 2018-05-03 RX ADMIN — CLONAZEPAM 1 MG: 1 TABLET ORAL at 17:01

## 2018-05-03 RX ADMIN — CLONAZEPAM 1 MG: 1 TABLET ORAL at 07:49

## 2018-05-03 RX ADMIN — BUSPIRONE HYDROCHLORIDE 22.5 MG: 15 TABLET ORAL at 20:23

## 2018-05-03 RX ADMIN — ACETAMINOPHEN 650 MG: 325 TABLET, FILM COATED ORAL at 17:01

## 2018-05-03 RX ADMIN — METHYLPHENIDATE HYDROCHLORIDE 36 MG: 36 TABLET ORAL at 07:49

## 2018-05-03 ASSESSMENT — VISUAL ACUITY
OU: NORMAL ACUITY

## 2018-05-03 NOTE — PLAN OF CARE
Problem: Patient Care Overview  Goal: Plan of Care/Patient Progress Review  Outcome: No Change  VEEG monitoring. No events this shift. Neuro intact. AVSS. Up SBA/GB. Ambulated x1 this shift. Hygiene break yesterday. Voiding. Regular diet.  PIV SL. Continue POC.

## 2018-05-03 NOTE — MR AVS SNAPSHOT
After Visit Summary   5/3/2018    Jaja Patton    MRN: [id number]           Patient Information     Date Of Birth          1980        Visit Information        Provider Department      5/3/2018 7:00 AM [facility] EEG TECH 4 [facility] EEG        Today's Diagnoses     Epilepsy (H)    -  1       Follow-ups after your visit        Who to contact     Please call your clinic at [phone] to:    Ask questions about your health    Make or cancel appointments    Discuss your medicines    Learn about your test results    Speak to your doctor            Additional Information About Your Visit        MyChart Information     Hail Varsity is an electronic gateway that provides easy, online access to your medical records. With Hail Varsity, you can request a clinic appointment, read your test results, renew a prescription or communicate with your care team.     To sign up for Hail Varsity visit the website at [email or web address]   You will be asked to enter the access code listed below, as well as some personal information. Please follow the directions to create your username and password.     Your access code is: [email or web address]  Expires: 2018  2:48 PM     Your access code will  in 90 days. If you need help or a new code, please contact your [facility] or call [phone] for assistance.        Care EveryWhere ID     This is your Care EveryWhere ID. This could be used by other organizations to access your [place] medical records  [id number]         Blood Pressure from Last 3 Encounters:   18 114/59   18 123/82    Weight from Last 3 Encounters:   18 68.7 kg (151 lb 7.3 oz)   18 66.5 kg (146 lb 9.6 oz)              Today, you had the following     No orders found for display         Today's Medication Changes      Notice     This visit is during an admission. Changes to the med list made in this visit will be reflected in the After Visit Summary of the  admission.             Primary Care Provider Fax #    Physician No Ref-Primary [phone]       No address on file        Equal Access to Services     JUAN CONNOR : Hadjovita aad ku hadjimmy Staley, yany gillesralf, ashley piña, jaquan stafford laalejandraangel nascimento. So [facility] [phone].    ATENCIÓN: Si habla español, tiene a waters disposición servicios gratuitos de asistencia lingüística. Llame al [phone].    We comply with applicable federal civil rights laws and [place] laws. We do not discriminate on the basis of race, color, national origin, age, disability, sex, sexual orientation, or gender identity.            Thank you!     Thank you for choosing [facility]  for your care. Our goal is always to provide you with excellent care. Hearing back from our patients is one way we can continue to improve our services. Please take a few minutes to complete the written survey that you may receive in the mail after your visit with us. Thank you!             Your Updated Medication List - Protect others around you: Learn how to safely use, store and throw away your medicines at www.disposemymeds.org.      Notice     This visit is during an admission. Changes to the med list made in this visit will be reflected in the After Visit Summary of the admission.

## 2018-05-03 NOTE — PROCEDURES
Procedure Date: 2018      EEG #: -7       DATE OF RECORDING/SERVICE DATE:  2018      TYPE OF STUDY:  Inpatient video EEG monitoring.      DURATION OF STUDY:  23 hours, 34 minutes, 34 seconds.      HISTORY:  Day #7 of video EEG monitoring Jackie Patton, a 37-year-old who presented with medically refractory seizures.  She is being evaluated for further treatment including possible resective epilepsy surgery.  On presentation she was being treated with topiramate 300 mg per day, Fycompa 12 mg per day, and the vagal nerve stimulator.  Vagal nerve stimulator was turned off, topiramate was discontinued, and Fycompa reduced to 6 mg per day to facilitate recording of seizures.      TECHNICAL SUMMARY:  EEG is recorded from scalp electrodes placed according to the 10-20 International System.  Additional electrodes were utilized for referencing, artifact detection, and recording from other cerebral regions.  Video was continuously recorded.  Video was reviewed for clinical correlation and to assist with EEG interpretation.      FINDINGS:  During sleep, symmetrical sleep spindles.  Moderate to significant alpha persistence.  REM sleep is seen in the early morning hours with a desynchronized background, lack of sleep spindles, and rapid eye movements.  During wakefulness, a 9 Hz posterior dominant rhythm, reasonably well sustained, symmetrical, reactive.      Hyperventilation and photic stimulation are not performed.      INTERICTAL ABNORMALITIES:  No epileptiform discharges.      ICTAL ABNORMALITIES:  No electrographic seizures.      IMPRESSION:  Normal.  No epileptiform activity or seizures.         HYACINTH STRONG MD             D: 2018   T: 2018   MT: CLIFFORD      Name:     JACKIE PATTON   MRN:      [id number]        Account:        [id number]   :      1980           Procedure Date: 2018      Document: [id number]

## 2018-05-03 NOTE — PLAN OF CARE
Problem: Patient Care Overview  Goal: Plan of Care/Patient Progress Review  VEEG monitoring. No events witnessed or reported this shift. Neuros intact. IV SL. Pt ambulating in halls with assist of one. Pt to be sleep deprived tonight. Cont POC.

## 2018-05-03 NOTE — PROCEDURES
Procedure Date: 2018      EEG #:  -8      DATE OF RECORDING/SERVICE DATE:  2018      TYPE OF STUDY:  Inpatient video EEG monitoring.      DURATION OF STUDY:  23 hours 44 minutes 47 seconds.      HISTORY:  Day 8 of video EEG monitoring Jackie Patton, a 37-year-old who presents for further evaluation and treatment of her medically intractable partial seizures.  Local neurologic caregivers have referred her for potential resective epilepsy surgery.  She reports spells of staring and unresponsiveness.  She reports spells of stiffening and jerking, often with injury.  She presented on topiramate 300 mg per day and Fycompa 12 mg per day.  Topiramate was discontinued and Fycompa reduced in order to facilitate recording of seizures.  Serial sleep deprivation has been enforced      TECHNICAL SUMMARY:  EEG is recorded from scalp electrodes placed according to the 10-20 International System.  Additional electrodes were utilized for referencing, artifact detection, and recording from other cerebral regions.  Video was continuously recorded.  Video was reviewed for clinical correlation and to assist with EEG interpretation.      FINDINGS:  During quiet wakefulness, 9-10 Hz posterior dominant rhythm, symmetrical, reactive, well sustained.  No clear focal slowing.  During sleep, symmetrical sleep spindles.  Moderate alpha persistence.      OTHER INTERICTAL ABNORMALITIES:  No epileptiform discharges.      ICTAL ABNORMALITIES:  No electrographic seizures.      IMPRESSION:  Normal.  No epileptiform activity or seizures.         HYACINTH STRONG MD             D: 2018   T: 2018   MT: CLIFFORD      Name:     JACKIE PATTON   MRN:      [id number]        Account:        [id number]   :      1980           Procedure Date: 2018      Document: [id number]

## 2018-05-03 NOTE — PROGRESS NOTES
[facility] - Epilepsy Service Daily Note      Interval History:   Patient reports no events overnight. Official EEG report pending. Reports mild sore throat and stuffy nose. Otherwise doing well.     Review of System:   Denies nausea, vomitting, abdominal pain, headaches, dizziness and chest pain.     Medications:   Antiepileptic Medications Home Doses:   1. Fycompa 0- 12  2. Topamax 150- 150  3. Clonazepam 1.5- 1.5    Antiepileptic Medications Current Doses:   1. Fycompa 0- 6  2. Clonazepam 1- 1    Exam: Blood pressure 99/44, pulse 66, temperature 98  F (36.7  C), temperature source Oral, resp. rate 16, weight 68.7 kg (151 lb 7.3 oz), SpO2 96 %.  General appearance: No acute distress, awake  Neuro: Pupils are equal, round, and reactive to light, face symmetric, no pronator drift, equal  strength, normal to light touch with no sensory deficits noted, finger to nose normal, no focal deficits noted.    Video EE/3/2018: Pending.     2018: IMPRESSION:  Normal.  No epileptiform activity or seizures.     Assessment/ Plan: Patient seen and discussed with attending physician Dr. Gregory.   1. Jaja Patton is a 37 year old right handed female with a reported h/o Arnold- Chiari malformation, intractable epilepsy since age 32, s/p VNS placement who is being admitted for characterization and presurgical evaluation. Spells of staring and unresponsiveness had no EEG changes. Need to record convulsive seizures.     - Continue video EGG monitoring  - Seizure precautions  - SCDs while in bed for DVT prevention  - Ambulate with staff at least three times a day  - Hold Fycompa tonight  - Sleep deprivation tonight. No naps till 0300 am on . Sleep between 3- 7 am. Then awake till 1000 pm on 2018.   - Exercise mat at bedside      Discharge planning:  Type of target event identified: staring spell with altered awareness, convulsion  Number of events: more needed, 5-need to record  convulsions   Discharge medication plan: To be decided  Further Imaging studies needed prior to discharge: no  Discharge transportation: not discussed  Other pertinent issues/goals for discharge: none      Total time: 15 minute was spent in the care of this patient. The patient agrees with the above mentioned plan of care. I answered all the patient's questions and addressed immediate concerns. More than 50% of time spent consisted of counseling and coordinating care, including discussion of the diagnostic significance of EEG findings, anti-seizure medication management, and planning for discharge home.     Hayley Barrett, CNP  Neurology

## 2018-05-04 ENCOUNTER — ALLIED HEALTH/NURSE VISIT (OUTPATIENT)
Dept: NEUROLOGY | Facility: CLINIC | Age: 38
DRG: 101 | End: 2018-05-04
Attending: PSYCHIATRY & NEUROLOGY
Payer: COMMERCIAL

## 2018-05-04 DIAGNOSIS — R56.9 CONVULSIONS, UNSPECIFIED CONVULSION TYPE (H): Primary | ICD-10-CM

## 2018-05-04 PROCEDURE — 25000132 ZZH RX MED GY IP 250 OP 250 PS 637: Performed by: PSYCHIATRY & NEUROLOGY

## 2018-05-04 PROCEDURE — 95951 ZZHC EEG VIDEO EACH 24 HR: CPT | Mod: ZF

## 2018-05-04 PROCEDURE — 12000001 ZZH R&B MED SURG/OB UMMC

## 2018-05-04 RX ADMIN — CLONAZEPAM 1 MG: 1 TABLET ORAL at 17:02

## 2018-05-04 RX ADMIN — ACETAMINOPHEN 650 MG: 325 TABLET, FILM COATED ORAL at 00:50

## 2018-05-04 RX ADMIN — BUTALBITAL, ACETAMINOPHEN AND CAFFEINE 1 TABLET: 50; 325; 40 TABLET ORAL at 15:34

## 2018-05-04 RX ADMIN — Medication 1500 MG: at 07:33

## 2018-05-04 RX ADMIN — ACETAMINOPHEN 650 MG: 325 TABLET, FILM COATED ORAL at 13:32

## 2018-05-04 RX ADMIN — PRAZOSIN HYDROCHLORIDE 6 MG: 5 CAPSULE ORAL at 21:17

## 2018-05-04 RX ADMIN — BUSPIRONE HYDROCHLORIDE 22.5 MG: 15 TABLET ORAL at 07:32

## 2018-05-04 RX ADMIN — METHYLPHENIDATE HYDROCHLORIDE 36 MG: 36 TABLET ORAL at 07:32

## 2018-05-04 RX ADMIN — BUSPIRONE HYDROCHLORIDE 22.5 MG: 15 TABLET ORAL at 21:16

## 2018-05-04 RX ADMIN — DESVENLAFAXINE SUCCINATE 100 MG: 50 TABLET, EXTENDED RELEASE ORAL at 07:33

## 2018-05-04 RX ADMIN — PERAMPANEL 6 MG: 2 TABLET ORAL at 21:16

## 2018-05-04 RX ADMIN — CLONAZEPAM 1 MG: 1 TABLET ORAL at 07:32

## 2018-05-04 RX ADMIN — NICOTINE 1 PATCH: 21 PATCH TRANSDERMAL at 07:33

## 2018-05-04 RX ADMIN — TRAZODONE HYDROCHLORIDE 150 MG: 50 TABLET ORAL at 21:17

## 2018-05-04 ASSESSMENT — VISUAL ACUITY
OU: NORMAL ACUITY

## 2018-05-04 NOTE — PLAN OF CARE
Problem: Patient Care Overview  Goal: Plan of Care/Patient Progress Review  VEEG leads intact. No events this shift. Continue sleep deprivation until 10pm. Neuros intact. Pt very soft spoken. Up with SBA. Voiding spontaneously, BM this shift. PIV SL. Old PIV site on L forearm tender/sore. Tolerating regular diet. Ambulated halls x1. Continue to monitor.

## 2018-05-04 NOTE — PROGRESS NOTES
"  Care Coordinator Progress Note    Admission Date/Time:  2018  Attending MD:  Dr Miguel Gregory    Data  Chart reviewed, discussed with interdisciplinary team. Pt on continuous video EEG monitoring. Pt is from [place], [place].       Patient was admitted for:  Seizure characterization and pre-surgical evaluation.   Past history includes:  Arnold-Chiari malformation, surgery x2; intractable epilepsy; s/p VNS placement in 2015; iron deficiency anemia; occasional bronchospastic asthma.     Concerns with insurance coverage for discharge needs: None. Pt's insurance is [employer] and Medicare.    Current Living Situation: Patient lives with family.  Support System: Supportive      Coordination of Care and Referrals  Introduced myself to pt and explained I help with discharge planning. Pt lying in bed; weepy. Difficult to understand her because she spoke so softly; had to ask her to repeat herself. I asked why she was upset and pt said because she missed the time to have her Advanced Directive notarized. Pt said she was sleep deprived and forgot. Informed her I will have nursing see if a Notary is available yet today or tomorrow. Put \"Notary\" on white board in room as a reminder.   Pt said her sister lives in [place], [place]; she works at a  home and is working today. Pt continued to be weepy so I did not continue conversation.  Spoke with pt's nurse, Ambar and informed her pt upset she didn't a notary for her Advanced Directive. Ambar said pt was also upset because she hasn't had a seizure yet and wants to go home. Ambar will follow-up for availability of a Notary.     Assessment  Pt emotional for multiple reasons. Offered reassurance and support.      Plan  Anticipated Discharge Date:  When video EEG monitoring complete.   Anticipated Discharge Plan:   Anticipate discharge home when EEG monitoring complete.         Mayelin Cat RN Care Coordinator  [address], [facility]          "

## 2018-05-04 NOTE — PLAN OF CARE
Problem: Patient Care Overview  Goal: Plan of Care/Patient Progress Review  Outcome: No Change  VEEG monitoring. No events this shift. Sleep deprived this shift until 0300; will wake at 0700. Pt will then stay awake until 2200. Neuro intact. AVSS. Up SBA/GB. Ambulated x2 this shift. Voiding. Regular diet.  PIV SL. Continue POC.

## 2018-05-04 NOTE — MR AVS SNAPSHOT
After Visit Summary   2018    Jaja Patton    MRN: [id number]           Patient Information     Date Of Birth          1980        Visit Information        Provider Department      2018 7:00 AM [facility] EEG TECH 4 [facility] EEG        Today's Diagnoses     Convulsions, unspecified convulsion type (H)    -  1       Follow-ups after your visit        Who to contact     Please call your clinic at [phone] to:    Ask questions about your health    Make or cancel appointments    Discuss your medicines    Learn about your test results    Speak to your doctor            Additional Information About Your Visit        MyChart Information     [employer] is an electronic gateway that provides easy, online access to your medical records. With [employer], you can request a clinic appointment, read your test results, renew a prescription or communicate with your care team.     To sign up for [employer] visit the website at [email or web address]   You will be asked to enter the access code listed below, as well as some personal information. Please follow the directions to create your username and password.     Your access code is: [email or web address]  Expires: 2018  2:48 PM     Your access code will  in 90 days. If you need help or a new code, please contact your [facility] or call [phone] for assistance.        Care EveryWhere ID     This is your Care EveryWhere ID. This could be used by other organizations to access your [place] medical records  [id number]         Blood Pressure from Last 3 Encounters:   18 118/67   18 123/82    Weight from Last 3 Encounters:   18 68.7 kg (151 lb 7.3 oz)   18 66.5 kg (146 lb 9.6 oz)              Today, you had the following     No orders found for display         Today's Medication Changes      Notice     This visit is during an admission. Changes to the med list made in this visit will be reflected in the  After Visit Summary of the admission.             Primary Care Provider Fax #    Physician No Ref-Primary [phone]       No address on file        Equal Access to Services     JUAN CONNOR : Hadjovita aad ku hadtoniaidaila Nayelyraheemali, yany gillesjazha, ashley piña, jaquan nascimento. So [facility] [phone].    ATENCIÓN: Si habla español, tiene a waters disposición servicios gratuitos de asistencia lingüística. Llame al [phone].    We comply with applicable federal civil rights laws and [place] laws. We do not discriminate on the basis of race, color, national origin, age, disability, sex, sexual orientation, or gender identity.            Thank you!     Thank you for choosing [facility]  for your care. Our goal is always to provide you with excellent care. Hearing back from our patients is one way we can continue to improve our services. Please take a few minutes to complete the written survey that you may receive in the mail after your visit with us. Thank you!             Your Updated Medication List - Protect others around you: Learn how to safely use, store and throw away your medicines at www.disposemymeds.org.      Notice     This visit is during an admission. Changes to the med list made in this visit will be reflected in the After Visit Summary of the admission.

## 2018-05-04 NOTE — PROGRESS NOTES
[facility], [place]   Epilepsy Service Daily Note      Interval History:   No spells yesterday or overnight. Fycompa was held last night and she was sleep deprived.     Review of System:   Nasal congestion, sore throat, mild cough. Diarrhea. No nausea, No vomiting, no dizziness, no chest pain.     Medications:   Antiepileptic Medications Home Doses: Fycompa 12 hs, topiramate 150-150, klonopin 1.5-1.5   Antiepileptic Medications Current Doses: Fycompa 6 hs (held last night 5/3), klonopin 1-1    Exam: Blood pressure 118/67, pulse 68, temperature 97.6  F (36.4  C), temperature source Oral, resp. rate 16, weight 68.7 kg (151 lb 7.3 oz), SpO2 97 %.   General: NAD  Head: NC/AT  Neuro: Alert and oriented. Speech fluent. Flat affect. Tearful. Hearing intact to normal conversation. Pupils equal, round and reactive to light. EOM's intact. Face symmetric, tongue midline. No distal weakness. No drift or pronation. Intact FNF.  EEG: normal thus far   Assessment and Plan:   1. Jaja Patton is a 37 year old right handed female with a reported h/o Arnold- Chiari malformation, intractable epilepsy since age 32, s/p VNS placement who is being admitted for characterization and presurgical evaluation. Spells of staring and unresponsiveness had no EEG changes. Need to record convulsive seizures.     -continue vEEG monitoring  -continue off topiramate  -continue Fycompa 6 hs  -continue klonopin 1-1       Zuly Naik PA-C    Type of target event identified: staring spell with altered awareness, convulsion  Number of events: more needed, 5-need to record convulsions   Discharge medication plan: To be decided  Further Imaging studies needed prior to discharge: no  Discharge transportation: not discussed  Other pertinent issues/goals for discharge: none    Total time: 15 minute was spent in the care of this patient. The patient agrees with the above mentioned plan of care. I answered all the patient's  questions and addressed immediate concerns. More than 50% of time spent consisted of counseling and coordinating care, including discussion of the diagnostic significance of EEG findings, anti-seizure medication management, and planning for discharge home

## 2018-05-05 ENCOUNTER — ALLIED HEALTH/NURSE VISIT (OUTPATIENT)
Dept: NEUROLOGY | Facility: CLINIC | Age: 38
DRG: 101 | End: 2018-05-05
Attending: PSYCHIATRY & NEUROLOGY
Payer: COMMERCIAL

## 2018-05-05 DIAGNOSIS — G40.909 EPILEPSY (H): Primary | ICD-10-CM

## 2018-05-05 PROCEDURE — 25000132 ZZH RX MED GY IP 250 OP 250 PS 637: Performed by: PSYCHIATRY & NEUROLOGY

## 2018-05-05 PROCEDURE — 12000008 ZZH R&B INTERMEDIATE UMMC

## 2018-05-05 RX ADMIN — CLONAZEPAM 1 MG: 1 TABLET ORAL at 07:56

## 2018-05-05 RX ADMIN — BUSPIRONE HYDROCHLORIDE 22.5 MG: 15 TABLET ORAL at 07:56

## 2018-05-05 RX ADMIN — Medication 1500 MG: at 07:57

## 2018-05-05 RX ADMIN — BUSPIRONE HYDROCHLORIDE 22.5 MG: 15 TABLET ORAL at 20:32

## 2018-05-05 RX ADMIN — CLONAZEPAM 1 MG: 1 TABLET ORAL at 16:55

## 2018-05-05 RX ADMIN — DESVENLAFAXINE SUCCINATE 100 MG: 50 TABLET, EXTENDED RELEASE ORAL at 07:56

## 2018-05-05 RX ADMIN — METHYLPHENIDATE HYDROCHLORIDE 36 MG: 36 TABLET ORAL at 07:57

## 2018-05-05 RX ADMIN — PRAZOSIN HYDROCHLORIDE 6 MG: 5 CAPSULE ORAL at 22:16

## 2018-05-05 RX ADMIN — NICOTINE 1 PATCH: 21 PATCH TRANSDERMAL at 07:57

## 2018-05-05 RX ADMIN — PERAMPANEL 6 MG: 2 TABLET ORAL at 22:16

## 2018-05-05 RX ADMIN — TRAZODONE HYDROCHLORIDE 150 MG: 50 TABLET ORAL at 22:16

## 2018-05-05 ASSESSMENT — VISUAL ACUITY
OU: NORMAL ACUITY

## 2018-05-05 NOTE — PROCEDURES
Procedure Date: 2018      EEG #:  -9      DATE OF RECORDING/SERVICE DATE:  2018      TYPE OF STUDY:  Inpatient video EEG monitoring.      DURATION OF STUDY:  23 hours, 40 minutes, 40 seconds.      HISTORY:  Day 9 of video EEG monitoring in Jackie Patton, a 37-year-old with medically intractable seizures.  She describes minor spells of staring and unresponsiveness.  She describes some major motor spells of stiffening and convulsive activity with multiple injuries.  She has failed treatment with multiple anti-seizure medications.  She is currently being treated with Fycompa 12 mg per day.  Fycompa has been reduced to 6 mg per day and is being intermittently held so as to facilitate recording of seizures.      TECHNICAL SUMMARY:  The EEG was recorded with scalp electrodes placed according to the 10-20 International System.  Additional electrodes were utilized for referencing, artifact detection, and recording from other cerebral regions.  Video was continuously recorded.  Video was reviewed for clinical correlation and to assist with EEG interpretation.      FINDINGS:  During wakefulness, 9-10 Hz posterior dominant rhythm, symmetrical, reactive, usually well sustained.  No clear focal slowing.  During sleep, symmetrical sleep spindles.  Occasional vertex waves.  Spindles were quite persistent.  There was also some persistence of alpha activity.        OTHER INTERICTAL ABNORMALITIES:  No epileptiform discharges.      ICTAL ABNORMALITIES:  No electrographic seizures.      IMPRESSION:  Normal.  No epileptiform activity or seizures.         HYACINTH STRONG MD             D: 2018   T: 2018   MT: CLIFFORD      Name:     JACKIE PATTON   MRN:      [id number]        Account:        [id number]   :      1980           Procedure Date: 2018      Document: [id number]

## 2018-05-05 NOTE — PROCEDURES
Procedure Date: 2018      EEG #:  -10.      DATE OF RECORDING/SERVICE DATE:  2018.      TYPE OF STUDY:  Inpatient video EEG monitoring.         DURATION OF RECORDIN hours 32 minutes 35 seconds.      HISTORY:  Day 10 of video-EEG monitoring, Jaja Patton, a 37-year-old with medically intractable partial seizures who presents for evaluation of potential resective epilepsy surgery.  She reports minor spells of staring and unresponsiveness.  She reports major spells of posturing and collapse with trauma.  She presented on topiramate 350 mg per day and Fycompa 12 mg per day.  Topiramate has been discontinued and Fycompa reduced to facilitate recording of seizures.  Fycompa was also periodically held to facilitate recording of seizures and had been held night prior to this study.  The patient has also been sleep deprived.      TECHNICAL SUMMARY:  EEG was recorded from scalp electrodes placed according to the 10-20 international system.  Additional electrodes were utilized for referencing, artifact detection, and recording from other cerebral regions.  Video was continuously recorded.  Video was reviewed for clinical correlation and to assist with EEG interpretation.      FINDINGS:  During quiet wakefulness, a 10 Hz posterior dominant rhythm, well sustained, symmetrical, reactive.  During sleep, vertex waves and sleep spindles.  Sleep spindles are quite abundant.  There is some degree of alpha persistence.  Infrequent wickety theta is seen  independently over the 2 temporal regions.      ACTIVATING PROCEDURES:  Repeated bouts of hyperventilation, photic stimulation, and other procedures were employed in order to precipitate seizures but were not successful.  They did not induce any EEG changes.      ICTAL:  No electrographic seizures.  No spells recorded.      IMPRESSION:  Normal study.  No epileptiform discharges or seizures recorded.         HYACINTH STRONG MD             D: 2018   T:  2018   MT: CLIFFORD      Name:     JACKIE IVY   MRN:      [id number]        Account:        [id number]   :      1980           Procedure Date: 2018      Document: [id number]

## 2018-05-05 NOTE — PLAN OF CARE
Problem: Patient Care Overview  Goal: Plan of Care/Patient Progress Review  Outcome: Declining  Pt is on 6A for seizure monitoring. Surgical candidate. A&Ox4. Neuros intact. Calm and cooperative. VEEG leads intact. Seizure precautions in place. No events witnessed or reported this shift. Voids spontaneously. Had a bm today. Up SBA. PIV SL. Hygiene break with shower today. Tolerating a regular diet with good appetite. Ambulated hallways today. Klonopin and Fycompa are AEDs.

## 2018-05-05 NOTE — PLAN OF CARE
Problem: Patient Care Overview  Goal: Plan of Care/Patient Progress Review  Outcome: No Change  VSS. AOx4. Neuros intact except illogical thoughts at times. Pt was cooperative this shift. Pt has pain when moving in bed but refuses any type of intervention. Voiding spontaneously via bedside urinal. Pt needs assistance using urinal to avoid spilling on self and bed but refuses help. No BM. PIV SL. Up w/2 and GB. BA on at all times. NPO since midnight for procedure today. Pt slept between cares. CT pending, will call again this am for a second attempt. Continue to monitor and follow POC.

## 2018-05-05 NOTE — PLAN OF CARE
Problem: Patient Care Overview  Goal: Plan of Care/Patient Progress Review  Outcome: No Change  Pt here on seizure monitoring. VEEG leads intact; no events witnessed or reported this shift. VSS. AOx4. Neuros intact. Homesick yesterday per report. Voids spontaneously. No BM this shift. Up SBA. PIV SL. Slept between cares. Continue to monitor and follow POC.

## 2018-05-05 NOTE — PROGRESS NOTES
NEUROLOGY/EPILEPSY ATTENDING NOTE    No seizures. Minor URI. Otherwise denies complaints. Upset that she has not had any spells.  Fifteen minutes of hyperventilation, sustained upgaze, cognitive stress and photic stimulation did not induce any spells.  Hygeine break today occurred without spell.    Ms Patton asked me to call her  with an update which I did approx 540 PM Central time today. No response. Unable to leave message because voice mail box was full.    We again reviewed previous treatments. She ahs been treated with levetiracetam, divalproex, phenyotin, phenobarbital, gabapentin, pregabalin, and lacosamide. She recalls that valproate and lacosamide caused toxicity but we have no detials aboutother medications. She denies treatment with lamotrigine, oxcarbazepine, zonisamide, brivaracetam, or felbatol.    AEDs: fycompa 6 mg QHS (down from 12 mg QH). Topiramate 0 (down from 300 mg/d on admission). VNS is turned off.    EXAM: Alert, oriented. Language fluent. Not obviously depressed or anxious. VFF. EOMI without nystagmus. Smile symmetrical. Tongue midline and strong. No drift, pronation, or tremor. FFN is done well. Strength appears full.    EEG: Ten Hz posterior dominant rhythm. No epileptiform discharges or electrographic seizures. Reviewed EEG results thus far. She had two spells of unresponsiveness and automatic activity on day two of recording (4/26/2018) that were non-epileptic.    MRI looks normal to my eye. No hippocampal atrophy. No signal abnormality on T2 coronal images. I do not think single cut signal change left medial temporal lobe on IR sequence alone is meaningful. Neuropsych testing shows some left hemispheric findings but these could be related to topirmate she was taking at the time. Severe depression and anxiety noted.    IMPRESSION  1) Diagnosis unclear. Not clear whether epileptic seizures, nonepileptic spells, or perhaps, both. No EEG abnormalities thus far.  2) Chronic head  pain. Would avoid nortriptyline given multiple serotonergic psychotropic medicatoins.  3) Multiple psychiatric diagnoses; partially treated, appears stable emotionally.    PLAN:  1) Continue video-EEG monitoring.  2) Continue on current reduced AED doses.  3) Consider starting oxcarbazepine at discharge. Give very long half life of fycompa will probably require coverage for convulsion as fycompa levels build back up. Consider progressive muscle relaxation.  4) Longer discussion tomorrow regarding state of our understanding of her condition and future treatment plans.    Miguel Gregory MD  Pager [phone]

## 2018-05-05 NOTE — MR AVS SNAPSHOT
After Visit Summary   2018    Jaja Patton    MRN: [id number]           Patient Information     Date Of Birth          1980        Visit Information        Provider Department      2018 7:00 AM [facility] EEG TECH 4 [facility] EEG        Today's Diagnoses     Epilepsy (H)    -  1       Follow-ups after your visit        Who to contact     Please call your clinic at [phone] to:    Ask questions about your health    Make or cancel appointments    Discuss your medicines    Learn about your test results    Speak to your doctor            Additional Information About Your Visit        MyChart Information     [employer] is an electronic gateway that provides easy, online access to your medical records. With [employer], you can request a clinic appointment, read your test results, renew a prescription or communicate with your care team.     To sign up for [employer] visit the website at [email or web address]   You will be asked to enter the access code listed below, as well as some personal information. Please follow the directions to create your username and password.     Your access code is: [email or web address]  Expires: 2018  2:48 PM     Your access code will  in 90 days. If you need help or a new code, please contact your [facility] or call [phone] for assistance.        Care EveryWhere ID     This is your Care EveryWhere ID. This could be used by other organizations to access your [place] medical records  [id number]         Blood Pressure from Last 3 Encounters:   18 117/65   18 123/82    Weight from Last 3 Encounters:   18 68.7 kg (151 lb 7.3 oz)   18 66.5 kg (146 lb 9.6 oz)              Today, you had the following     No orders found for display         Today's Medication Changes      Notice     This visit is during an admission. Changes to the med list made in this visit will be reflected in the After Visit Summary of the  admission.             Primary Care Provider Fax #    Physician No Ref-Primary [phone]       No address on file        Equal Access to Services     JUAN CONNOR : Hadjovita aad ku hadjimmy Staley, yany gillesralf, ashley piña, jaquan stafford laalejandraangel nasciemnto. So [facility] [phone].    ATENCIÓN: Si habla español, tiene a waters disposición servicios gratuitos de asistencia lingüística. Llame al [phone].    We comply with applicable federal civil rights laws and [place] laws. We do not discriminate on the basis of race, color, national origin, age, disability, sex, sexual orientation, or gender identity.            Thank you!     Thank you for choosing [facility]  for your care. Our goal is always to provide you with excellent care. Hearing back from our patients is one way we can continue to improve our services. Please take a few minutes to complete the written survey that you may receive in the mail after your visit with us. Thank you!             Your Updated Medication List - Protect others around you: Learn how to safely use, store and throw away your medicines at www.disposemymeds.org.      Notice     This visit is during an admission. Changes to the med list made in this visit will be reflected in the After Visit Summary of the admission.

## 2018-05-05 NOTE — PLAN OF CARE
Problem: Patient Care Overview  Goal: Plan of Care/Patient Progress Review  A/VSS.  VEEG leads intact.  No events this shift.  Sleep deprivation ended at 2200.  Neuros intact.   Patient is soft-spoken.  Weepy/sad this afternoon d/t feeling homesick and missing her family.  Voiding spont.  Up with SBA.  Ambulated in peters x1.  Left forearm, old PIV site tender.  Continue with plan.

## 2018-05-06 ENCOUNTER — ALLIED HEALTH/NURSE VISIT (OUTPATIENT)
Dept: NEUROLOGY | Facility: CLINIC | Age: 38
DRG: 101 | End: 2018-05-06
Attending: PSYCHIATRY & NEUROLOGY
Payer: COMMERCIAL

## 2018-05-06 DIAGNOSIS — G40.909 EPILEPSY (H): Primary | ICD-10-CM

## 2018-05-06 PROCEDURE — 25000132 ZZH RX MED GY IP 250 OP 250 PS 637: Performed by: PSYCHIATRY & NEUROLOGY

## 2018-05-06 PROCEDURE — 40000556 ZZH STATISTIC PERIPHERAL IV START W US GUIDANCE

## 2018-05-06 PROCEDURE — 25000125 ZZHC RX 250: Performed by: PSYCHIATRY & NEUROLOGY

## 2018-05-06 PROCEDURE — 25000128 H RX IP 250 OP 636: Performed by: PSYCHIATRY & NEUROLOGY

## 2018-05-06 PROCEDURE — 12000008 ZZH R&B INTERMEDIATE UMMC

## 2018-05-06 RX ORDER — ONDANSETRON HYDROCHLORIDE 4 MG/5ML
4 SOLUTION ORAL EVERY 6 HOURS PRN
Status: DISCONTINUED | OUTPATIENT
Start: 2018-05-06 | End: 2018-05-06 | Stop reason: CLARIF

## 2018-05-06 RX ORDER — KETOROLAC TROMETHAMINE 30 MG/ML
30 INJECTION, SOLUTION INTRAMUSCULAR; INTRAVENOUS EVERY 6 HOURS PRN
Status: DISCONTINUED | OUTPATIENT
Start: 2018-05-06 | End: 2018-05-09 | Stop reason: HOSPADM

## 2018-05-06 RX ORDER — ONDANSETRON 4 MG/1
4 TABLET, ORALLY DISINTEGRATING ORAL EVERY 6 HOURS PRN
Status: DISCONTINUED | OUTPATIENT
Start: 2018-05-06 | End: 2018-05-09 | Stop reason: HOSPADM

## 2018-05-06 RX ADMIN — CLONAZEPAM 1 MG: 1 TABLET ORAL at 18:10

## 2018-05-06 RX ADMIN — KETOROLAC TROMETHAMINE 30 MG: 30 INJECTION, SOLUTION INTRAMUSCULAR at 16:13

## 2018-05-06 RX ADMIN — NICOTINE 1 PATCH: 21 PATCH TRANSDERMAL at 08:52

## 2018-05-06 RX ADMIN — METHYLPHENIDATE HYDROCHLORIDE 36 MG: 36 TABLET ORAL at 08:52

## 2018-05-06 RX ADMIN — BUSPIRONE HYDROCHLORIDE 22.5 MG: 15 TABLET ORAL at 08:52

## 2018-05-06 RX ADMIN — TRAZODONE HYDROCHLORIDE 150 MG: 50 TABLET ORAL at 22:31

## 2018-05-06 RX ADMIN — ONDANSETRON 4 MG: 4 TABLET, ORALLY DISINTEGRATING ORAL at 17:12

## 2018-05-06 RX ADMIN — BUSPIRONE HYDROCHLORIDE 22.5 MG: 15 TABLET ORAL at 21:06

## 2018-05-06 RX ADMIN — BUTALBITAL, ACETAMINOPHEN AND CAFFEINE 1 TABLET: 50; 325; 40 TABLET ORAL at 13:15

## 2018-05-06 RX ADMIN — Medication 1500 MG: at 08:52

## 2018-05-06 RX ADMIN — PERAMPANEL 6 MG: 2 TABLET ORAL at 22:31

## 2018-05-06 RX ADMIN — DESVENLAFAXINE SUCCINATE 100 MG: 50 TABLET, EXTENDED RELEASE ORAL at 08:52

## 2018-05-06 RX ADMIN — PRAZOSIN HYDROCHLORIDE 6 MG: 5 CAPSULE ORAL at 22:31

## 2018-05-06 RX ADMIN — CLONAZEPAM 1 MG: 1 TABLET ORAL at 08:52

## 2018-05-06 ASSESSMENT — VISUAL ACUITY
OU: NORMAL ACUITY

## 2018-05-06 NOTE — PLAN OF CARE
Problem: Patient Care Overview  Goal: Plan of Care/Patient Progress Review  Pt on 6A for seizure monitoring. Afebrile, VSS on RA. VEEG leads intact. No witnessed or reported events this shift. A/Ox4. Neuros intact. Up with SBA and GB to bathroom. Voiding spontaneously. No BM this shift. Denies pain. Tolerating regular diet. PIV SL. Calling appropriately and making needs known. Will continue to monitor and follow POC.

## 2018-05-06 NOTE — PROCEDURES
Procedure Date: 05/05/2018      EEG #:  -11      DATE OF RECORDING/SERVICE DATE:  05/05/2018      TYPE OF STUDY:  Inpatient video EEG monitoring.      DURATION OF STUDY:  23 hours, 49 minutes, 47 seconds.      HISTORY:  Day 11 of video EEG monitoring in patient Jaja Patton, a 37-year-old with medically intractable seizures who is being evaluated for characterization of her epilepsy and for possible resective epilepsy surgery.  She reports spells of staring with unresponsiveness.  She reports major motor attacks with stiffening, jerking and unresponsiveness during which she reports various traumas.        MEDICATIONS:  She presented  on topiramate 300 mg per day and Fycompa 12 mg per day.  Topiramate was discontinued.  Fycompa has been reduced to 6 mg per day with the Fycompa dose being intermittently held.      TECHNICAL SUMMARY:  EEG is recorded from scalp electrodes placed according to the 10-20 International System.  Additional electrodes were utilized for referencing, artifact detection, and recording from other cerebral regions.  Video was continuously recorded.  Video was reviewed for clinical correlation and to assist with EEG interpretation.      FINDINGS:  Well-sustained 10 Hz posterior dominant rhythm while awake.  Abundant sleep spindles with some alpha persistence while asleep.  During a period of REM sleep in the early morning hours, a more desynchronized background is noted together with rapid eye movements.      ACTIVATING PROCEDURES:  Attempts were made to induce seizures with a series of activating procedures including serial hyperventilation, sustained upgaze, administration of cognitive stressors, and photic stimulation.  This induced periods of lightheadedness and mild dissociated sensations, but did not induce patient's spells.  EEG during these periods of time were normal.        OTHER INTERICTAL ABNORMALITIES:  No epileptiform discharges.      ICTAL ABNORMALITIES:  No electrographic  seizures.      IMPRESSION:  Normal.  No epileptiform activity.  No seizures.         HYACINTH STRONG MD             D: 2018   T: 2018   MT: CLIFFORD      Name:     JACKIE IVY   MRN:      [id number]        Account:        [id number]   :      1980           Procedure Date: 2018      Document: [id number]

## 2018-05-06 NOTE — PLAN OF CARE
Problem: Patient Care Overview  Goal: Plan of Care/Patient Progress Review  Outcome: No Change  Pt on 6A for seizure monitoring. VEEG leads in place, no events witnessed or reported this shift. VSS. A&Ox4. Neuros intact. PIV SL. On regular diet and tolerating well. Voiding spontaneously via bathroom. Went on multiple walks this evening. No complaints of pain. Up with SBA. Uses call light appropriately. Will continue to monitor and follow POC.

## 2018-05-06 NOTE — PLAN OF CARE
Problem: Patient Care Overview  Goal: Plan of Care/Patient Progress Review  Outcome: Improving  Pt is on 6A for seizure monitoring. Surgical candidate. A&Ox4. Neuros intact. Calm and cooperative. VEEG leads intact. Seizure precautions in place. No events witnessed or reported this shift. Voids spontaneously. Had a bm today. Up SBA. PIV SL. Complained of headache. Fioricet given. Tolerating a regular diet with good appetite. Ambulated hallways. Klonopin and Fycompa are AEDs.

## 2018-05-06 NOTE — MR AVS SNAPSHOT
After Visit Summary   2018    Jaja Patton    MRN: [id number]           Patient Information     Date Of Birth          1980        Visit Information        Provider Department      2018 7:00 AM [facility] EEG TECH 4 [facility] EEG        Today's Diagnoses     Epilepsy (H)    -  1       Follow-ups after your visit        Who to contact     Please call your clinic at [phone] to:    Ask questions about your health    Make or cancel appointments    Discuss your medicines    Learn about your test results    Speak to your doctor            Additional Information About Your Visit        MyChart Information     Beneq is an electronic gateway that provides easy, online access to your medical records. With Beneq, you can request a clinic appointment, read your test results, renew a prescription or communicate with your care team.     To sign up for Beneq visit the website at [email or web address]   You will be asked to enter the access code listed below, as well as some personal information. Please follow the directions to create your username and password.     Your access code is: [email or web address]  Expires: 2018  2:48 PM     Your access code will  in 90 days. If you need help or a new code, please contact your [facility] or call [phone] for assistance.        Care EveryWhere ID     This is your Care EveryWhere ID. This could be used by other organizations to access your [place] medical records  [id number]         Blood Pressure from Last 3 Encounters:   18 96/47   18 123/82    Weight from Last 3 Encounters:   18 68.7 kg (151 lb 7.3 oz)   18 66.5 kg (146 lb 9.6 oz)              Today, you had the following     No orders found for display         Today's Medication Changes      Notice     This visit is during an admission. Changes to the med list made in this visit will be reflected in the After Visit Summary of the admission.              Primary Care Provider Fax #    Physician No Ref-Primary [phone]       No address on file        Equal Access to Services     JUAN CONNOR : Hadii aad ku hadtoniaidalia Rebeka, yany gillesjazha, ashley piña, jaquan joaquinines stafford laalejandraangel nascimento. So [facility] [phone].    ATENCIÓN: Si habla español, tiene a waters disposición servicios gratuitos de asistencia lingüística. Llame al [phone].    We comply with applicable federal civil rights laws and [place] laws. We do not discriminate on the basis of race, color, national origin, age, disability, sex, sexual orientation, or gender identity.            Thank you!     Thank you for choosing [facility]  for your care. Our goal is always to provide you with excellent care. Hearing back from our patients is one way we can continue to improve our services. Please take a few minutes to complete the written survey that you may receive in the mail after your visit with us. Thank you!             Your Updated Medication List - Protect others around you: Learn how to safely use, store and throw away your medicines at www.disposemymeds.org.      Notice     This visit is during an admission. Changes to the med list made in this visit will be reflected in the After Visit Summary of the admission.

## 2018-05-06 NOTE — PROGRESS NOTES
NEUROLOGY/EPILEPSY ATTENDING NOTE    No spells. Headaches somewhat worse today.    We reviewed records from [facility]. Major motor spell recorded, sounds like one of her target spells, EEG during this did not show seizure discharge. MRI showed scattered small white matter changes that were not thought to be epileptogenic and were attributed to migraines.    AEDs: fycompa 0 (12 mg/d on admission). topiramate 0 (300 mg/d on admission)    EXAM: Alert, oriented. Language fluent. Not obviously depressed or anxious. VFF. EOMI without nystagmus. Smile symmetrical. Tongue midline and strong. No drift, pronation, or tremor. FFN is done well. Strength appears full.    EEG: Normal background. No epileptiform discharges. No seizures.    EXAM: Alert, oriented. Language fluent. Not obviously depressed or anxious. VFF. EOMI without nystagmus. Smile symmetrical. Tongue midline and strong. No drift, pronation, or tremor. FFN is done well. Strength appears full.    IMPRESSION  1) Nonepilpetic events have been documented both here and at [facility]. Reports of significant trauma during her attacks and reports of interictal epileptiform abnormalities at [facility] (tho not here) suggest she has epileptic seizures as well. Unfortunately we have not been able to record these in spite of aggressive efforts and lengthy recording so cannot determine how many of her attacks are epileptic and whether epileptic attacks are amenable to resective or other types of epilepsy surrgery.  2) Chronic head pain, somewhat worse today.  3) Multiple psychiatric diagnoses, partially treated.    DISCUSSION  Results of evaluation thus far discussed frankly and supportively. Told that events we had recorded were not epileptic seizures. Told events appeared to be an unusual manifestation of unresolved stressors and conflicts. Told events appeared to be subconscious. We emphasized that outside evaluations and history of trauma suggests she has epileptic seizures as  well. However given that none have been recorded it is difficult to say how many of her attacks are epileptic and whether they might respond to epilepsy surgery. Potential treatment approaches to nonepileptic events  discussed. Patient expressed frustration but appeared to accept diagnosis. She gave permission to discuss with .    Subsequently discussed above with  as well by phone.    PLAN:  1) Continue video-EEG monitoring. Continue current AEDs tonite but would probably resume fycompa 12 mg QHS tomorrow. Consider starting oxcarbazepine prior to discharge. Reinitiae VNS tomorrow at low doses and gradually increase. We spoke of Wednesday morning as a possible discharge time.  2) Will ask staff to teach progressive muscle relaxation and practice while in hospital.  3) Will provide references for [facility] workbooks for [facility] to see if this can be worked into local therapy.  4) Toradol for headache. Nortryptilene probably not good choice given multiple serotonergic drugs. Will defer to local physician. Wonder whether botox may be a consdieration.  5) lamotrigine, zonisamide, brivaracetam or felbatol are remaining options. She expressed interest in dietary approach and we spoke of Kim's diet. However this would need to be approached with input from her local behavioral health providers given reported previous issues with eating disorder.    Total time today 48 minutes more than half counselling and coordinating cares including conversations with .    Miguel Gregory MD

## 2018-05-07 ENCOUNTER — ALLIED HEALTH/NURSE VISIT (OUTPATIENT)
Dept: NEUROLOGY | Facility: CLINIC | Age: 38
End: 2018-05-07
Attending: PSYCHIATRY & NEUROLOGY
Payer: COMMERCIAL

## 2018-05-07 DIAGNOSIS — G40.909 EPILEPSY (H): Primary | ICD-10-CM

## 2018-05-07 PROCEDURE — 25000132 ZZH RX MED GY IP 250 OP 250 PS 637: Performed by: PSYCHIATRY & NEUROLOGY

## 2018-05-07 PROCEDURE — 12000001 ZZH R&B MED SURG/OB UMMC

## 2018-05-07 PROCEDURE — 40000556 ZZH STATISTIC PERIPHERAL IV START W US GUIDANCE

## 2018-05-07 RX ORDER — OXCARBAZEPINE 300 MG/1
300 TABLET, FILM COATED ORAL 2 TIMES DAILY
Status: DISCONTINUED | OUTPATIENT
Start: 2018-05-07 | End: 2018-05-09 | Stop reason: HOSPADM

## 2018-05-07 RX ADMIN — BUSPIRONE HYDROCHLORIDE 22.5 MG: 15 TABLET ORAL at 09:28

## 2018-05-07 RX ADMIN — TRAZODONE HYDROCHLORIDE 150 MG: 50 TABLET ORAL at 22:20

## 2018-05-07 RX ADMIN — BUSPIRONE HYDROCHLORIDE 22.5 MG: 15 TABLET ORAL at 22:21

## 2018-05-07 RX ADMIN — Medication 1500 MG: at 09:29

## 2018-05-07 RX ADMIN — PRAZOSIN HYDROCHLORIDE 6 MG: 5 CAPSULE ORAL at 22:20

## 2018-05-07 RX ADMIN — NICOTINE 1 PATCH: 21 PATCH TRANSDERMAL at 09:27

## 2018-05-07 RX ADMIN — CLONAZEPAM 1 MG: 1 TABLET ORAL at 17:32

## 2018-05-07 RX ADMIN — PERAMPANEL 12 MG: 2 TABLET ORAL at 22:20

## 2018-05-07 RX ADMIN — DESVENLAFAXINE SUCCINATE 100 MG: 50 TABLET, EXTENDED RELEASE ORAL at 09:28

## 2018-05-07 RX ADMIN — METHYLPHENIDATE HYDROCHLORIDE 36 MG: 36 TABLET ORAL at 09:28

## 2018-05-07 RX ADMIN — CLONAZEPAM 1 MG: 1 TABLET ORAL at 09:28

## 2018-05-07 RX ADMIN — OXCARBAZEPINE 300 MG: 300 TABLET ORAL at 22:21

## 2018-05-07 RX ADMIN — ACETAMINOPHEN 650 MG: 325 TABLET, FILM COATED ORAL at 16:05

## 2018-05-07 RX ADMIN — DOCUSATE SODIUM 100 MG: 100 CAPSULE, LIQUID FILLED ORAL at 16:05

## 2018-05-07 RX ADMIN — OXCARBAZEPINE 300 MG: 300 TABLET ORAL at 16:05

## 2018-05-07 ASSESSMENT — VISUAL ACUITY
OU: NORMAL ACUITY

## 2018-05-07 NOTE — PROGRESS NOTES
mphoria Vagus Nerve Stimulator interrogated and turned back on. Settings as follows:-  Patient ID SC, Model , Serial # [id number], Implant date 2017-01-16.    NORMAL SETTINGS:  Output Current 0 mA, ---Set to 1.750  Pulse/signal Frequency 30 Hz,  Pulse Width 750 ìsec,  On Time 30 sec,  Off Time 0.8 min,  IFI NO.    MAGNET SETTINGS:  Magnet Output Current 0 mA, ---Set to 1.875 mA,  Magnet Pulse Width 750 ìsec,  Magnet On Time 60 sec.    AUTOSTIM SETTINGS:   AutoStim Current 0.0 mA  AutoStim Pulse Width 500 ìsec  AutoStim On Time 60 Sec.    CONFIGURATION SETTINGS:  Tachycardia Detection OFF

## 2018-05-07 NOTE — PLAN OF CARE
Problem: Patient Care Overview  Goal: Plan of Care/Patient Progress Review  Pt on 6A for seizure monitoring. VSS. Afebrile. VEEG leads intact. No reported or witnessed events this shift. A/Ox4, neuros intact ex numbness on bottom of feet bilaterally, at baseline per pt. SBA with GB to bathroom. Voiding spontaneously. No BM this shift. Denies pain. PIV SL. Tolerating regular diet. Calling appropriately and making needs known. Will continue to monitor and follow POC.

## 2018-05-07 NOTE — PROCEDURES
Procedure Date: 2018      EEG #-12       DATE OF RECORDING/SERVICE DATE:  2018       TYPE OF STUDY:  Day 12 of 24-hour video EEG.       DURATION OF STUDY:  23 hours, 50 minutes, 26 seconds.       CLINICAL SUMMARY:  The patient is a 37-year-old right-handed female who presented for evaluation of focal epilepsy.  EEG was performed for characterization.      MEDICATIONS:  The patient is on BuSpar, Klonopin, trazodone, Concerta, Fycompa.      TECHNICAL SUMMARY: This continuous video- EEG monitoring procedure was performed with 23 scalp electrodes in 10-20 electrode system placement, and additional scalp, precordial and other surface electrodes used for electrical referencing and artifact detection.  Video monitoring was utilized and periodically reviewed by EEG technologists and the physician for electroclinical correlations.     INTERICTAL ACTIVITY:  During maximal wakefulness, there was 10 Hz alpha activity over the posterior head regions which was symmetric and attenuated by eye opening.  Drowsiness was manifested as dropout of the posterior dominant rhythm and diffuse theta activity and horizontal eye movements.  Stage 2 sleep was manifested as vertex waves, symmetric sleep spindles and K complexes.  No epileptiform discharges were present.      Photic stimulation did not induce photic driving response.      CLINICAL/ICTAL EVENTS:  No electrographic or clinical seizures or paroxysmal behavioral events were recorded.      IMPRESSION:  Normal video EEG.  No epileptiform discharges were present.  No electrographic or clinical seizures were recorded.         YVON GREGORY MD             D: 2018   T: 2018   MT: SKIP      Name:     JACKIE IVY   MRN:      [id number]        Account:        [id number]   :      1980           Procedure Date: 2018      Document: [id number]

## 2018-05-07 NOTE — PLAN OF CARE
Problem: Seizure Disorder/Epilepsy (Adult)  Goal: Signs and Symptoms of Listed Potential Problems Will be Absent, Minimized or Managed (Seizure Disorder/Epilepsy)  Signs and symptoms of listed potential problems will be absent, minimized or managed by discharge/transition of care (reference Seizure Disorder/Epilepsy (Adult) CPG).   Outcome: No Change  Pt on 6A for seizure monitoring. VEEG leads in place, no events witnessed or reported this shift. VSS. A&Ox4. Neuros intact ex numbness in bottom of feet at baseline. PIV SL. On regular diet and tolerating well. Voiding spontaneously via bathroom. Went on multiple walks this evening. Pt was given toradol x1 for headache, pt became nauseas with emesis shortly after pain medications were given, relieved partially with zofran x1. Up with SBA. Uses call light appropriately. Will continue to monitor and follow POC.

## 2018-05-07 NOTE — PLAN OF CARE
Problem: Patient Care Overview  Goal: Plan of Care/Patient Progress Review  Outcome: Improving  Pt is on 6A for seizure monitoring and presurgical evaluation. A&Ox4. Neuros intact exp baseline numbness to soles of feet. Calm and cooperative. VEEG leads intact. Seizure precautions in place. No events witnessed or reported this shift. Voids spontaneously. Up SBA. PIV SL. Negligible headache. Fioricet available. Tolerating a regular diet with good appetite. Ambulates hallways frequently. Fycompa increased. Trileptal started. Possible discharge Wednesday morning. VEEG monitoring continues.

## 2018-05-07 NOTE — MR AVS SNAPSHOT
After Visit Summary   2018    Jaja Patton    MRN: [id number]           Patient Information     Date Of Birth          1980        Visit Information        Provider Department      2018 7:00 AM [facility] EEG TECH 4 [facility] EEG        Today's Diagnoses     Epilepsy (H)    -  1       Follow-ups after your visit        Who to contact     Please call your clinic at [phone] to:    Ask questions about your health    Make or cancel appointments    Discuss your medicines    Learn about your test results    Speak to your doctor            Additional Information About Your Visit        MyChart Information     [employer]ge is an electronic gateway that provides easy, online access to your medical records. With [employer], you can request a clinic appointment, read your test results, renew a prescription or communicate with your care team.     To sign up for [employer] visit the website at [email or web address]   You will be asked to enter the access code listed below, as well as some personal information. Please follow the directions to create your username and password.     Your access code is: [email or web address]  Expires: 2018  2:48 PM     Your access code will  in 90 days. If you need help or a new code, please contact your [facility] or call [phone] for assistance.        Care EveryWhere ID     This is your Care EveryWhere ID. This could be used by other organizations to access your [place] medical records  [id number]         Blood Pressure from Last 3 Encounters:   18 92/50   18 123/82    Weight from Last 3 Encounters:   18 68.7 kg (151 lb 7.3 oz)   18 66.5 kg (146 lb 9.6 oz)              Today, you had the following     No orders found for display         Today's Medication Changes      Notice     This visit is during an admission. Changes to the med list made in this visit will be reflected in the After Visit Summary of the admission.              Primary Care Provider Fax #    Physician No Ref-Primary [phone]       No address on file        Equal Access to Services     JUAN CONNOR : Hadii aad ku hadtoniaidalia Rebeka, yany gillesjazha, ashley piña, jaquan joaquinines stafford laalejandraangel nascimento. So [facility] [phone].    ATENCIÓN: Si habla español, tiene a waters disposición servicios gratuitos de asistencia lingüística. Llame al [phone].    We comply with applicable federal civil rights laws and [place] laws. We do not discriminate on the basis of race, color, national origin, age, disability, sex, sexual orientation, or gender identity.            Thank you!     Thank you for choosing [facility]  for your care. Our goal is always to provide you with excellent care. Hearing back from our patients is one way we can continue to improve our services. Please take a few minutes to complete the written survey that you may receive in the mail after your visit with us. Thank you!             Your Updated Medication List - Protect others around you: Learn how to safely use, store and throw away your medicines at www.disposemymeds.org.      Notice     This visit is during an admission. Changes to the med list made in this visit will be reflected in the After Visit Summary of the admission.

## 2018-05-07 NOTE — PROGRESS NOTES
[facility] - Epilepsy Service Daily Note      Interval History:   Patient reports no events overnight. EEG has been normal. Reports headache this morning. Otherwise doing well.     Review of System:   Denies nausea, vomitting, abdominal pain, dizziness and chest pain.     Medications:   Antiepileptic Medications Home Doses:   1. Fycompa 0- 12  2. Topamax 150- 150  3. Clonazepam 1.5- 1.5    Antiepileptic Medications Current Doses:   1. Fycompa 0- 6  2. Clonazepam 1- 1    Exam: Blood pressure 92/50, pulse 89, temperature 97.2  F (36.2  C), temperature source Oral, resp. rate 16, weight 68.7 kg (151 lb 7.3 oz), SpO2 98 %.  General appearance: No acute distress. Awake, alert and oriented x 4.   Neuro: Pupils are equal, round, and reactive to light, face symmetric, no pronator drift, equal  strength, normal to light touch with no sensory deficits noted, finger to nose normal, no focal deficits noted.    Video EE2018: IMPRESSION:  Normal video EEG.  No epileptiform discharges were present.  No electrographic or clinical seizures were recorded.         Assessment/ Plan: Patient seen and discussed with attending physician Dr. Rose.   1. Jaja Patton is a 37 year old right handed female with a reported h/o Arnold- Chiari malformation, intractable epilepsy since age 32, s/p VNS placement who is being admitted for characterization and presurgical evaluation. Spells of staring and unresponsiveness had no EEG changes. Nonepilpetic events have been documented both here and at [facility]. Reports of significant trauma during her attacks and reports of interictal epileptiform abnormalities at [facility] (tho not here) suggest she has epileptic seizures as well. Unfortunately we have not been able to record these in spite of aggressive efforts and lengthy recording so cannot determine how many of her attacks are epileptic and whether epileptic attacks are amenable to resective or other types  of epilepsy [facility]. This was discussed with patient yesterday by Dr. Gregory. We will start her on AEDs today in preparation for discharge on Wednesday.    - Continue video EGG monitoring  - Seizure precautions  - SCDs while in bed for DVT prevention  - Ambulate with staff at least three times a day  - Restart Fycompa 12 mg HS from tonight  - Start Oxcarbazepine 300- 300  - Turn VNS on  - PMR education    2. Chronic head pain: Previously treated with Gabapentin and Topamax without much benefit. She has been on Fioricet prn for the past 10 years or so. Doesn't feel that it is helping with headaches. Per Dr. Gregory's notes, Nortryptilene probably not good choice given multiple serotonergic drugs. Discussed Inderal for headache prevention (also an antihypertensive). But patient's BP has been on the lower side ( 96//60) with a HR between 63- 75. She is also on Prazosin 6 mg at bedtime for PTSD. This medication also can reduce BP. We will hold off on Inderal for now due to the risk for hypotension. Patient was updated with this and was instructed to discuss further treatment options ( possibly Botox) with her PCP.     - Continue Tylenol prn as ordered      Discharge planning:  Type of target event identified: staring spell with altered awareness, convulsion  Number of events: more needed, need to record convulsions   Discharge medication plan: Fycompa, Oxcarbazepine and Clonazepam   Further Imaging studies needed prior to discharge: no  Discharge transportation: Family to provide  Other pertinent issues/goals for discharge: none     Total time: 15 minute was spent in the care of this patient. The patient agrees with the above mentioned plan of care. I answered all the patient's questions and addressed immediate concerns. More than 50% of time spent consisted of counseling and coordinating care, including discussion of the diagnostic significance of EEG findings, anti-seizure medication management, and planning  for discharge home.     Hayley Barrett, CNP  Neurology

## 2018-05-08 ENCOUNTER — ALLIED HEALTH/NURSE VISIT (OUTPATIENT)
Dept: NEUROLOGY | Facility: CLINIC | Age: 38
DRG: 101 | End: 2018-05-08
Attending: PSYCHIATRY & NEUROLOGY
Payer: COMMERCIAL

## 2018-05-08 DIAGNOSIS — G40.909 EPILEPSY (H): Primary | ICD-10-CM

## 2018-05-08 PROCEDURE — 40000141 ZZH STATISTIC PERIPHERAL IV START W/O US GUIDANCE

## 2018-05-08 PROCEDURE — 25000132 ZZH RX MED GY IP 250 OP 250 PS 637: Performed by: PSYCHIATRY & NEUROLOGY

## 2018-05-08 PROCEDURE — 12000008 ZZH R&B INTERMEDIATE UMMC

## 2018-05-08 PROCEDURE — 95951 ZZHC EEG VIDEO EACH 24 HR: CPT | Mod: ZF

## 2018-05-08 RX ADMIN — DESVENLAFAXINE SUCCINATE 100 MG: 50 TABLET, EXTENDED RELEASE ORAL at 09:00

## 2018-05-08 RX ADMIN — TRAZODONE HYDROCHLORIDE 150 MG: 50 TABLET ORAL at 21:13

## 2018-05-08 RX ADMIN — OXCARBAZEPINE 300 MG: 300 TABLET ORAL at 21:13

## 2018-05-08 RX ADMIN — Medication 1500 MG: at 09:00

## 2018-05-08 RX ADMIN — NICOTINE 1 PATCH: 21 PATCH TRANSDERMAL at 09:00

## 2018-05-08 RX ADMIN — METHYLPHENIDATE HYDROCHLORIDE 36 MG: 36 TABLET ORAL at 09:00

## 2018-05-08 RX ADMIN — PRAZOSIN HYDROCHLORIDE 6 MG: 5 CAPSULE ORAL at 21:13

## 2018-05-08 RX ADMIN — BUSPIRONE HYDROCHLORIDE 22.5 MG: 15 TABLET ORAL at 09:00

## 2018-05-08 RX ADMIN — BUSPIRONE HYDROCHLORIDE 22.5 MG: 15 TABLET ORAL at 21:13

## 2018-05-08 RX ADMIN — OXCARBAZEPINE 300 MG: 300 TABLET ORAL at 09:03

## 2018-05-08 RX ADMIN — ACETAMINOPHEN 650 MG: 325 TABLET, FILM COATED ORAL at 16:47

## 2018-05-08 RX ADMIN — CLONAZEPAM 1 MG: 1 TABLET ORAL at 18:11

## 2018-05-08 RX ADMIN — PERAMPANEL 12 MG: 2 TABLET ORAL at 21:13

## 2018-05-08 RX ADMIN — CLONAZEPAM 1 MG: 1 TABLET ORAL at 09:00

## 2018-05-08 ASSESSMENT — VISUAL ACUITY
OU: NORMAL ACUITY

## 2018-05-08 NOTE — PLAN OF CARE
Problem: Seizure Disorder/Epilepsy (Adult)  Goal: Signs and Symptoms of Listed Potential Problems Will be Absent, Minimized or Managed (Seizure Disorder/Epilepsy)  Signs and symptoms of listed potential problems will be absent, minimized or managed by discharge/transition of care (reference Seizure Disorder/Epilepsy (Adult) CPG).   Pt on 6A for seizure monitoring. VEEG leads in place; no events witnessed or reported this shift. Trileptal started this evening. VSS. A&O x4. Neuro unchanged: Numbness to soles of feet. Pt c/o HA controlled with PRN Tylenol. Voiding spontaneously. No BM this shift; PRN Colace given x1. SBA and GB. PIV SL. Regular diet; good intake. Pt walked halls with staff x1. Continue to monitor and follow current POC.

## 2018-05-08 NOTE — PLAN OF CARE
Problem: Patient Care Overview  Goal: Plan of Care/Patient Progress Review  Pt here for video-sz monitoring, vss, neuros include: a/o x4 and intact. PIV SL, regular diet, voiding spont, passing gas, pt hasn't had BM for past 3 days, offered stool softeners but pt declined and reported she had taken Colace yesterday. Up SBA w/GB, sz-precautions maintained, pt compliant, no events were witnessed/reported. Plan is to d/c tomorrow, continue to monitor per MD orders.

## 2018-05-08 NOTE — PLAN OF CARE
Problem: Patient Care Overview  Goal: Plan of Care/Patient Progress Review  Outcome: No Change    VSS except soft BP.  Denied pain.  At beginning of shift, pt very lethargic and had difficulty staying awake for neuro assessment-was able to follow majority of commands, d/o to time.  At 0400, pt more awake and neuros intact except baseline numbness to bilat feet.  VEEG leads in place; no events witnessed or reported.  Seizure precautions in place.  PIV SL.  On regular diet.  Voiding spontaneously.  No BM, hypoactive bowel sounds.  Up with SBA/1 and GB.  VNS on and magnet attached to pts suitcase.  Possible d/c Wednesday.  Continue with POC.

## 2018-05-08 NOTE — PROGRESS NOTES
[facility] - Epilepsy Service Daily Note      Interval History:   Patient reports no events overnight. EEG has been normal. Fycompa was increased to 12 mg last night (PTA dose) and was started on Oxcarbazepine 300- 300 yesterday. No major complaints. Tolerated the medications well.     Review of System:   Denies nausea, vomitting, abdominal pain, headaches, dizziness and chest pain.     Medications:   Antiepileptic Medications Home Doses:   1. Fycompa 0- 12  2. Topamax 150- 150  3. Clonazepam 1.5- 1.5    Antiepileptic Medications Current Doses:   1. Fycompa 0- 12  2. Clonazepam 1- 1  3. Oxcarbazepine 300- 300    Exam: Blood pressure 129/59, pulse 81, temperature 97.2  F (36.2  C), temperature source Oral, resp. rate 16, weight 68.7 kg (151 lb 7.3 oz), SpO2 99 %.  General appearance: No acute distress. Awake, alert and oriented x 4  Neuro: Pupils are equal, round, and reactive to light, face symmetric, no pronator drift, equal  strength, normal to light touch with no sensory deficits noted, finger to nose normal, no focal deficits noted.    Video EE2018: IMPRESSION:  Normal video EEG.  No epileptiform discharges or pathological slowing were present.  No electrographic or clinical seizures or paroxysmal behavioral events were recorded.     Assessment/ Plan: Patient seen and discussed with attending physician Dr. Rose.   1. Jaja Patton is a 37 year old right handed female with a reported h/o Arnold- Chiari malformation, intractable epilepsy since age 32, s/p VNS placement who is being admitted for characterization and presurgical evaluation. Spells of staring and unresponsiveness had no EEG changes. Nonepilpetic events have been documented both here and at [facility]. Reports of significant trauma during her attacks and reports of interictal epileptiform abnormalities at [facility] (tho not here) suggest she has epileptic seizures as well. Unfortunately we have not been able to  record these in spite of aggressive efforts and lengthy recording so cannot determine how many of her attacks are epileptic and whether epileptic attacks are amenable to resective or other types of epilepsy surrgery. This was discussed with patient over the weekend by Dr. Gregory.     Fycompa was increased to 12 mg last night (PTA dose) and was started on Oxcarbazepine 300- 300. She is tolerating these medications well without any side effects.     - Continue video EGG monitoring  - Seizure precautions  - SCDs while in bed for DVT prevention  - Ambulate with staff at least three times a day  - Continue Oxcarbazepine 300- 300  - Continue Clonazepam 1- 1  - Continue Fycompa 0- 12  - PMR education        Discharge planning:  Type of target event identified: staring spell with altered awareness, convulsion  Number of events: more needed, need to record convulsions   Discharge medication plan: Fycompa, Oxcarbazepine and Clonazepam   Further Imaging studies needed prior to discharge: no  Discharge transportation: Family to provide  Other pertinent issues/goals for discharge: none    Total time: 15 minute was spent in the care of this patient. The patient agrees with the above mentioned plan of care. I answered all the patient's questions and addressed immediate concerns. More than 50% of time spent consisted of counseling and coordinating care, including discussion of the diagnostic significance of EEG findings, anti-seizure medication management, and planning for discharge home.     Hayley Barrett, CNP  Neurology

## 2018-05-08 NOTE — PROCEDURES
Procedure Date: 2018      EEG #-13       DATE OF RECORDING/SERVICE DATE:  2018       TYPE OF STUDY:  Day  of 24-hour video EEG.       DURATION OF STUDY:  23 hours, 51 minutes, 37 seconds.       CLINICAL SUMMARY:  The patient is a 37-year-old right-handed woman with focal epilepsy.  EEG was performed for characterization of seizures.        The patient is taking BuSpar, Klonopin, trazodone, Concerta, Fycompa and Trileptal on this day of monitoring.     TECHNICAL SUMMARY: This continuous video- EEG monitoring procedure was performed with 23 scalp electrodes in 10-20 electrode system placement, and additional scalp, precordial and other surface electrodes used for electrical referencing and artifact detection.  Video monitoring was utilized and periodically reviewed by EEG technologists and the physician for electroclinical correlations.     INTERICTAL ACTIVITY:  During quiet wakefulness, there was 9-10 Hz alpha activity over the posterior head regions which was symmetric and attenuated by eye opening.  Stage 2 sleep was manifested as vertex waves and symmetric sleep spindles.  No epileptiform discharges were present.      Photic stimulation and hyperventilation were not done.      CLINICAL/ICTAL EVENTS:  No electrographic or clinical seizures were recorded.      IMPRESSION:  Normal video EEG.  No epileptiform discharges or pathological slowing were present.  No electrographic or clinical seizures or paroxysmal behavioral events were recorded.         YVON GREGORY MD             D: 2018   T: 2018   MT: SKIP      Name:     JACKIE IVY   MRN:      [id number]        Account:        [id number]   :      1980           Procedure Date: 2018      Document: [id number]

## 2018-05-09 ENCOUNTER — ALLIED HEALTH/NURSE VISIT (OUTPATIENT)
Dept: NEUROLOGY | Facility: CLINIC | Age: 38
DRG: 101 | End: 2018-05-09
Attending: PSYCHIATRY & NEUROLOGY
Payer: COMMERCIAL

## 2018-05-09 VITALS
BODY MASS INDEX: 26 KG/M2 | DIASTOLIC BLOOD PRESSURE: 54 MMHG | HEART RATE: 71 BPM | WEIGHT: 151.46 LBS | SYSTOLIC BLOOD PRESSURE: 111 MMHG | OXYGEN SATURATION: 97 % | RESPIRATION RATE: 16 BRPM | TEMPERATURE: 97.9 F

## 2018-05-09 DIAGNOSIS — R56.9 CONVULSIONS, UNSPECIFIED CONVULSION TYPE (H): Primary | ICD-10-CM

## 2018-05-09 PROCEDURE — 25000132 ZZH RX MED GY IP 250 OP 250 PS 637: Performed by: PSYCHIATRY & NEUROLOGY

## 2018-05-09 PROCEDURE — 95951 ZZHC EEG VIDEO < 12 HR: CPT | Mod: 52,ZF

## 2018-05-09 RX ORDER — CLONAZEPAM 1 MG/1
1 TABLET ORAL 2 TIMES DAILY
Status: SHIPPED | DISCHARGE
Start: 2018-05-09

## 2018-05-09 RX ORDER — OXCARBAZEPINE 300 MG/1
300 TABLET, FILM COATED ORAL 2 TIMES DAILY
Qty: 124 TABLET | Refills: 0 | Status: SHIPPED | OUTPATIENT
Start: 2018-05-09

## 2018-05-09 RX ADMIN — OXCARBAZEPINE 300 MG: 300 TABLET ORAL at 09:06

## 2018-05-09 RX ADMIN — NICOTINE 1 PATCH: 21 PATCH TRANSDERMAL at 09:08

## 2018-05-09 RX ADMIN — DESVENLAFAXINE SUCCINATE 100 MG: 50 TABLET, EXTENDED RELEASE ORAL at 09:07

## 2018-05-09 RX ADMIN — Medication 1500 MG: at 09:06

## 2018-05-09 RX ADMIN — CLONAZEPAM 1 MG: 1 TABLET ORAL at 09:06

## 2018-05-09 RX ADMIN — BUSPIRONE HYDROCHLORIDE 22.5 MG: 15 TABLET ORAL at 09:06

## 2018-05-09 RX ADMIN — METHYLPHENIDATE HYDROCHLORIDE 36 MG: 36 TABLET ORAL at 09:06

## 2018-05-09 ASSESSMENT — VISUAL ACUITY
OU: NORMAL ACUITY;GLASSES;BASELINE

## 2018-05-09 NOTE — PROGRESS NOTES
Jaja Patton  MRN: [id number]   : 1980      Times of Days  morning  evening       Medication  Oxcarbazepine Tablet Size Number of Tablets/Capsules Total Daily Dosage    - 18 300 mg 1  1    600 mg    - 18 300 mg 1.5  1.5    900 mg   18 onwards 300 mg  2  2    1200 mg

## 2018-05-09 NOTE — PLAN OF CARE
Problem: Patient Care Overview  Goal: Plan of Care/Patient Progress Review  Outcome: Adequate for Discharge Date Met: 05/09/18  Pt here for video-sz monitoring, vss, neuros include: a/o x4, bilateral N/T in feet @ baseline. PIV SL, regular diet, voiding spont, no GI issues reported, up SBA w/GB. No events were reported and/or witnessed, sz-precautions maintained, pt compliant. RN went over d/c education w/pt, all personal medications returned from security and bottle of wine was returned as well. Pt left to d/c pharmacy, her sister is planning to pick her up @ front door before 1300, pt requested to wait for ride in [employer], RN was comfortable w/plan.

## 2018-05-09 NOTE — PROCEDURES
Procedure Date: 05/09/2018      EEG #-15       DATE OF RECORDING/SERVICE DATE:  05/09/2018       TYPE OF STUDY:  Day 15 of 24-hour video EEG       SOURCE FILE DURATION:  11 hours, 21 minutes, 44 seconds.     CLINICAL SUMMARY:  The patient is a 37-year-old right-handed woman with focal epilepsy.  EEG was performed for characterization of seizures.        MEDICATIONS:  Fycompa 12 mg daily at bedtime, Trileptal 300 mg b.i.d., Concerta, trazodone, Klonopin 1 mg b.i.d. and BuSpar.     TECHNICAL SUMMARY: This continuous video- EEG monitoring procedure was performed with 23 scalp electrodes in 10-20 electrode system placement, and additional scalp, precordial and other surface electrodes used for electrical referencing and artifact detection.  Video monitoring was utilized and periodically reviewed by EEG technologists and the physician for electroclinical correlations.     INTERICTAL ACTIVITY:  During maximal wakefulness, there was 9 Hz alpha activity over the posterior head regions which was symmetric and attenuated by eye opening.  Drowsiness was manifested as dropout of the posterior dominant rhythm and diffuse theta activity and horizontal eye movements.  Stage 2 sleep was manifested as vertex waves, symmetric sleep spindles and K complexes.  No epileptiform discharges were present.      CLINICAL/ICTAL EVENTS:  No electrographic or clinical seizures were recorded.      IMPRESSION:  Normal video EEG.  No epileptiform discharges or pathological slowing were present.  No electrographic or clinical seizures or paroxysmal behavioral events were recorded.      SUMMARY OF 15 DAYS OF VIDEO EEG MONITORING:  EEG stayed normal throughout the recording during waking, drowsiness and sleep.  No epileptiform discharges were present.  No electrographic seizures were recorded.  Attempts were made to induce seizure with a series of activating procedures including serial hyperventilation, sustained upgaze, administration of  cognitive stressors and photic stimulation.  This induced lightheadedness and mild dissociated sensations but did not trigger the patient's spells and EEG stayed normal during these periods.  The patient had one convulsion off electrodes when the patient was getting MRI.  Staff reported stiffening, amnesia and some tremoring.  The patient was transferred to ICU and received high doses of benzodiazepines and some propofol in the interim.  Shewas hooked up approximately 1.5 hours following the spell.  EEG showed normal PDR and minor diffuse slowing.  No epileptiform discharges or ictal activity were seen.  In summary, recording did not show evidence of significant postictal effect.  This does not necessarily exclude the possibility that the patient had a seizure when electrodes were off.         YVON GREGORY MD             D: 2018   T: 2018   MT: SKIP      Name:     JACKIE IVY   MRN:      [id number]        Account:        [id number]   :      1980           Procedure Date: 2018      Document: [id number]
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
## 2018-05-09 NOTE — PLAN OF CARE
Problem: Patient Care Overview  Goal: Plan of Care/Patient Progress Review  Outcome: No Change    VSS. Reported discomfort at new PIV site, declined interventions. Neuros at baseline- intact except numbness to bilat feet.  VEEG leads in place; no events witnessed or reported.  Seizure precautions in place; pt compliant.  PIV SL, flushes well.  On regular diet.  Voiding spontaneously.  Last BM was yesterday per pt.  Up with SBA and GB.  VNS on and magnet attached to pts suitcase.  Plan is for discharge today.  Continue with POC.

## 2018-05-09 NOTE — PROCEDURES
Procedure Date: 2018      EEG #[id number]       DATE OF RECORDING/SERVICE DATE:  2018       TYPE OF STUDY:  Day 14 of 24-hour video EEG.       DURATION OF STUDY:  23 hours, 40 minutes, 4 seconds.     CLINICAL SUMMARY:  The patient is a 37-year-old right-handed woman with focal epilepsy.  EEG was performed for characterization of seizures.        MEDICATIONS:  BuSpar, Klonopin, trazodone, Concerta, Fycompa 12 mg daily at bedtime, Trileptal 300 mg b.i.d.     TECHNICAL SUMMARY: This continuous video- EEG monitoring procedure was performed with 23 scalp electrodes in 10-20 electrode system placement, and additional scalp, precordial and other surface electrodes used for electrical referencing and artifact detection.  Video monitoring was utilized and periodically reviewed by EEG technologists and the physician for electroclinical correlations.     INTERICTAL ACTIVITY:  During maximal wakefulness, there was 9 Hz alpha activity over the posterior head regions which was symmetric and attenuated by eye opening.  Drowsiness was manifested as dropout of the posterior dominant rhythm and diffuse theta activity and horizontal eye movements.  Stage 2 sleep was manifested as vertex waves, symmetric sleep spindles and K complexes.  No epileptiform discharges were present.      CLINICAL/ICTAL EVENTS:  No electrographic or clinical seizures or paroxysmal behavioral events were recorded.      IMPRESSION:  Normal video EEG.  No epileptiform discharges or pathological slowing were present.  No electrographic seizure or paroxysmal behavioral events were recorded.         YVON GREGORY MD             D: 2018   T: 2018   MT: SKIP      Name:     JACKIE IVY   MRN:      [id number]        Account:        [id number]   :      1980           Procedure Date: 2018      Document: [id number]

## 2018-05-09 NOTE — DISCHARGE INSTRUCTIONS
"Books that may help :-  Taking Control of Your Seizures: Workbook (Treatments That Work) Workbook Edition  by Conner Velasco  (Author), Leonila Khan (Author), Viviana Velasco (Author), GEOVANY Louis (Author)  ---  Treating Nonepileptic Seizures: Therapist Guide (Treatments That Work)   by GEOVANY Louis (Author), Esteban Ta (Author)  ---    \"The primary aim of 'Taking Control of Your Seizures: Workbook' is to improve the lives of patients with seizures. Both epileptic seizures and nonepileptic seizures (YAO) are prevalent and potentially disabling. The Workbook is designed to be used by a patient with seizures in conjunction with his or her counselor. The Workbook contains step-by-step guidelines that enable patients to take control of their seizures and their lives. The  'Treating Nonepileptic Seizures: Therapist Guide' enhances effectiveness by providing [id number] instructions for counselors who use the Workbook with patients with YAO. The authors developed this treatment approach based on extensive clinical experience and research with epilepsy and YAO. Many patients who have completed the Taking Control process experience fewer seizures, reduced symptoms, and a greater sense of well-being.\"            5/9/2018      Times of Days  morning  evening       Medication  Oxcarbazepine Tablet Size Number of Tablets/Capsules Total Daily Dosage   5/7 - 5/13/18 300 mg 1  1    600 mg   5/14 - 5/20/18 300 mg 1.5  1.5    900 mg   5/21/18 onwards 300 mg  2  2    1200 mg       Carry this with you at all times.  CONTINUE TAKING YOUR OTHER MEDICATIONS AS PREVIOUSLY DIRECTED.      * * *Do not store medications in the bathroom.  Keep medications away from children!* * *     "

## 2018-05-09 NOTE — PLAN OF CARE
Problem: Seizure Disorder/Epilepsy (Adult)  Goal: Signs and Symptoms of Listed Potential Problems Will be Absent, Minimized or Managed (Seizure Disorder/Epilepsy)  Signs and symptoms of listed potential problems will be absent, minimized or managed by discharge/transition of care (reference Seizure Disorder/Epilepsy (Adult) CPG).   Pt on 6A for seizure monitoring. VEEG leads in place; no events witnessed or reported this shift. Trileptal started yesterday. VSS. A&O x4. Neuro unchanged: numbness to soles of feet. Pt c/o R arm pain at IV site controlled with PRN Tylenol. PIV removed and new PIV to L hand SL. Voiding spontaneously. 1 BM this shift per pt. SBA and GB. PIV SL. Regular diet; good intake. Pt walked halls with staff x1. Continue to monitor and follow current POC.

## 2018-05-09 NOTE — PROGRESS NOTES
BOOK A TIGER Vagus Nerve Stimulator interrogated and returned to prior to admission settings, with the exception the the tachycardia detection was set to OFF, because the autostim settings were set to an output current of zero by home provider, and setting detection to off should increase device life.   Settings as follows:-    Patient ID SC, Model , Serial # [id number], Implant date 2017-01-16.    NORMAL SETTINGS:  Output Current 1.75 mA, set to 1.875  Pulse/signal Frequency 30 Hz,  Pulse Width 750 ìsec,  On Time 30 sec,  Off Time 0.8 min,  IFI NO.    MAGNET SETTINGS:  Magnet Output Current 1.875mA,set to 2.0mA  Magnet Pulse Width 750 ìsec,  Magnet On Time 60 sec.    AUTOSTIM SETTINGS:   AutoStim Current 0.0 mA  AutoStim Pulse Width 500 ìsec  AutoStim On Time 60 Sec.    CONFIGURATION SETTINGS:  Tachycardia Detection OFF  Threshold for AutoStim n/a  Heartbeat Detection (sensitivity) n/a        Device was reinterrogated following adjustment and settings confirmed.  Patient was observed following changes. She denied any problems of pain or discomfort with the changes, and was not observed to cough or exhibit other signs of discomfort.

## 2018-05-10 ENCOUNTER — CARE COORDINATION (OUTPATIENT)
Dept: CARE COORDINATION | Facility: CLINIC | Age: 38
End: 2018-05-10

## 2018-05-10 NOTE — PROGRESS NOTES
Patient will be contacted by an RN for post DC follow up so no duplicate post DC follow up call will be made            May 11, 2018  1:00 PM CDT   Telephone Call with [facility] Nurse 1   [facility] ([facility])     [address], [address]  [facility] [place] [facility] [id number] [phone]

## 2018-05-11 ENCOUNTER — VIRTUAL VISIT (OUTPATIENT)
Dept: NEUROLOGY | Facility: CLINIC | Age: 38
End: 2018-05-11
Payer: COMMERCIAL

## 2018-05-11 DIAGNOSIS — R56.9 CONVULSIONS, UNSPECIFIED CONVULSION TYPE (H): Primary | ICD-10-CM

## 2018-05-11 NOTE — MR AVS SNAPSHOT
After Visit Summary   2018    Jaja Patton    MRN: [id number]           Patient Information     Date Of Birth          1980        Visit Information        Provider Department      2018 1:00 PM 1, Me [facility] Nurse [facility]        Today's Diagnoses     Convulsions, unspecified convulsion type (H)    -  1       Follow-ups after your visit        Your next 10 appointments already scheduled     May 29, 2018 11:30 AM CDT   Telephone Call with MD [facility] ([facility])    [address], [address]  [facility] [address] [phone]           Note: This is not an onsite visit; there is no need to come to the facility.              Who to contact     Please call your clinic at [phone] to:    Ask questions about your health    Make or cancel appointments    Discuss your medicines    Learn about your test results    Speak to your doctor            Additional Information About Your Visit        MyChart Information     appbackr is an electronic gateway that provides easy, online access to your medical records. With appbackr, you can request a clinic appointment, read your test results, renew a prescription or communicate with your care team.     To sign up for Understoryt visit the website at [email or web address]   You will be asked to enter the access code listed below, as well as some personal information. Please follow the directions to create your username and password.     Your access code is: [email or web address]  Expires: 2018  2:48 PM     Your access code will  in 90 days. If you need help or a new code, please contact your [facility] or call [phone] for assistance.        Care EveryWhere ID     This is your Care EveryWhere ID. This could be used by other organizations to access your [place] medical records  [id number]         Blood Pressure from Last 3 Encounters:    05/09/18 111/54   04/23/18 123/82    Weight from Last 3 Encounters:   05/01/18 151 lb 7.3 oz (68.7 kg)   04/23/18 146 lb 9.6 oz (66.5 kg)              Today, you had the following     No orders found for display       Primary Care Provider Fax #    Physician No Ref-Primary [phone]       No address on file        Equal Access to Services     [facility] GEET[facility] : Hadii aad ku hadasho Soomaali, waaxda luqadaha, qaybta kaalmada adeegyada, waxay joaquinin hayjosen guerdalida stafford laRenzoosvaldo . So [facility] [phone].    ATENCIÓN: Si habla español, tiene a waters disposición servicios gratuitos de asistencia lingüística. Jessicaame al [phone].    We comply with applicable federal civil rights laws and [place] laws. We do not discriminate on the basis of race, color, national origin, age, disability, sex, sexual orientation, or gender identity.            Thank you!     Thank you for choosing [facility]  for your care. Our goal is always to provide you with excellent care. Hearing back from our patients is one way we can continue to improve our services. Please take a few minutes to complete the written survey that you may receive in the mail after your visit with us. Thank you!             Your Updated Medication List - Protect others around you: Learn how to safely use, store and throw away your medicines at www.disposemymeds.org.          This list is accurate as of 5/11/18  1:41 PM.  Always use your most recent med list.                   Brand Name Dispense Instructions for use Diagnosis    busPIRone 15 MG tablet    BUSPAR     Take 22.5 mg by mouth 2 times daily        CALCIUM 600 PO      Take 600 mg by mouth daily        clonazePAM 1 MG tablet    klonoPIN     Take 1 tablet (1 mg) by mouth 2 times daily    Episodic altered awareness       desvenlafaxine 100 MG 24 hr tablet    KHEDEZLA     Take 100 mg by mouth daily        FIORICET -40 MG Caps   Generic drug:  Butalbital-APAP-Caffeine      Take 1 capsule by mouth q4-6  hours prn migraine        FYCOMPA 12 MG tablet   Generic drug:  perampanel      Take 12 mg by mouth At Bedtime        LORazepam 1 MG tablet    ATIVAN     Take 1 mg by mouth For seizure activity, may repeat once after 30 minutes if needed for continued seizure activity        Methylphenidate HCl ER 36 MG Tb24      Take 36 mg by mouth daily        Milk Thistle 175 MG Caps      Take 175 mg by mouth daily        OXcarbazepine 300 MG tablet    TRILEPTAL    124 tablet    Take 1 tablet (300 mg) by mouth 2 times daily Increase by 300mg each week until taking 600mg twice daily    Intractable epilepsy without status epilepticus, unspecified epilepsy type (H)       prazosin 2 MG capsule    MINIPRESS     Take 6 mg by mouth At Bedtime        traZODone 50 MG tablet    DESYREL     Take 50 mg by mouth At Bedtime Take 2-4 tablets by mouth at bedtime        ZANTAC PO      Take 150 mg by mouth 2 times daily as needed for heartburn

## 2018-05-11 NOTE — PROGRESS NOTES
"Discharge phone call:    Jaja Patton MRN# [id number]   YOB: 1980 Age: 37 year old      Date of Admission:                                      4/25/2018  Date of Discharge:                                       5/9/2018  Admitting Physician:                                   Miguel Gregory MD  Discharge Physician:                                    Jacinta Johnson MD  Discharging Service:                                    Epilepsy Service   Primary Provider: No Ref-Primary, Physician  Referring Provider: Chencho Vizcarra; [address] [place] [id number]          Admission Diagnoses:   Seizure (H) [R56.9]               Discharge Diagnosis:    (not documented at time of this phone call)  Per chart review it appears 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 He said my MRI was clear but my paperwork says differently. They don't understand what causes the disruption in my white matter. I am disappointed that the one convulsion I did have that I wasn't hooked up to the EEG. I'm confused why they gave me propranolol when I went to the ICU. I will work with my neurologist to work on my headaches. \"    Hospital course: From unsigned note by Hayley Barrett NP:  \"Jaja was in the hospital from 4/25- 5/9/2018. PTA, she was on Fycompa 12 mg at bedtime, Topiramate 150- 150 and Clonazepam 1.5- 1.5. AED doses were adjusted for seizure induction. She was also sleep deprived. On 4/26, she had two staring and unresponsive spells without any EEG correlate. On 4/30, she had an event while she was in MRI. Unfortunately she did not have EEG leads in place. \"    Medications confirmed:    -300, She reports she has paperwork for the titration.  CZP 1.5 -1.5  Fycompa 12    Questions regarding admission or discharge instructions:  Patient feels sore from limited mobility during the hospital stay. She states she wishes she would " "have been able ambulate more often and was given the exercise mat that the doctor ordered. She discussed these concerns with Poppy, the nurse manager on the inpatient unit. She has no questions regarding her medications. When asked if there is anything I can do for the patient or any questions I can answer she states, \"no, but I thank you for calling.\"    Confirmed follow up scheduled:  She will return to the office of her local neurologist. Dr. Gregory will call her on 5/29/2018.           "

## 2018-05-15 ENCOUNTER — DOCUMENTATION ONLY (OUTPATIENT)
Dept: OTHER | Facility: CLINIC | Age: 38
End: 2018-05-15

## 2018-05-15 PROBLEM — Z71.89 ADVANCED DIRECTIVES, COUNSELING/DISCUSSION: Chronic | Status: ACTIVE | Noted: 2018-05-15

## 2018-05-15 NOTE — PROGRESS NOTES
The patient is a 37 year old right handed female with a h/o Arnold-Chiari malformation, s/p surgery x 2, intractable epilepsy, s/p VNS placement who underwent video EEG monitoring 4/25/2018 to 5/9/2018 for characterization of spells and potential presurgical evaluation.   She was evaluated at [facility] 2014 where a spell was recorded with no ictal discharge. Interictal EEG reportedly showed bifrontal spike wave, so continued AED treatment was recommended. She has multiple severe psychiatric diagnoses including PTSD, depression ADHD and history of significant chilhood disruption.  Also has chronic head pain in spite of two Chiari surgeries.      EEG stayed normal throughout the recording during waking, drowsiness and sleep.  No epileptiform discharges were present.  No electrographic seizures were recorded.  Attempts were made to induce seizure with a series of activating procedures including serial hyperventilation, sustained upgaze, administration of cognitive stressors and photic stimulation.  This induced lightheadedness and mild dissociated sensations but did not trigger the patient's spells and EEG stayed normal during these periods.  The patient had 2 staring spells with unresponsiveness with no EEG correlate.  These were consistent with psychogenic nonepileptic spells.   The patient had one convulsion off electrodes while at MRI.  Code blue was called.  Staff reported stiffening, some tremoring and amnesia.  The patient was transferred to ICU and received high doses of benzodiazepines and some propofol in the interim but not intubated.  She was hooked up approximately 1.5 hours following the spell.  EEG showed normal PDR and minor diffuse slowing.  No epileptiform discharges or ictal activity were seen.  In summary, recording did not show evidence of significant postictal effect.  This does not necessarily exclude the possibility that the patient had a seizure when electrodes were off.      She was seen by psychiatry who offered the diagnosis of PTSD, anxiety, unspecified, depression, unspecified, obsessive compulsive disorder, attention deficit disorder and agoraphobia.  Psychiatry noted that she has a tight bond with psychiatric care locally and preferred not to make any changes at this point without review of her previous psychiatric records and discussion with her current psychiatrist.  She will be going to follow up with her psychiatrist and therapist as an outpatient.     Brain MRI on 4/30/2018 at [facility] showed Slight asymmetric increased FLAIR signal along the medial left mesial temporal lobe without evidence of atrophy, could be due to seizure related edema or mesial temporal sclerosis. There were also nonspecific FLAIR hyperintensity in bilateral frontal bilateral subcortical white matter.     The patient was restarted on fycompa 12 mg qhs before discharge.  Her VNS was titrated up to PTA output current.  She was also started on oxcarbazepine 300 mg bid and was given verbal and written instructions to increase gradually to 600 mg bid and check OXC level and Na after reaching this dose or sooner if side effects arise.  Topiramate was not restarted.     The patient has chronic headaches and has tried multiple medications as prn and preventive, which haven't been effective.  She was recommended to try botox locally if this is available.  The patient was advised not to use OTC pain medications and Fioricet frequently as they may cause chronic daily headaches.      She is going to have a phone call follow up with Dr. Gregory in 3-4 weeks.      Jacinta Johnson MD

## 2018-05-29 ENCOUNTER — VIRTUAL VISIT (OUTPATIENT)
Dept: NEUROLOGY | Facility: CLINIC | Age: 38
End: 2018-05-29
Payer: COMMERCIAL

## 2018-05-29 DIAGNOSIS — R56.9 CONVULSIONS, UNSPECIFIED CONVULSION TYPE (H): Primary | ICD-10-CM

## 2018-05-29 NOTE — MR AVS SNAPSHOT
After Visit Summary   2018    Jaja Patton    MRN: [id number]           Patient Information     Date Of Birth          1980        Visit Information        Provider Department      2018 11:30 AM Miguel Gregory MD [facility]        Today's Diagnoses     Convulsions, unspecified convulsion type (H)    -  1       Follow-ups after your visit        Who to contact     Please call your clinic at [phone] to:    Ask questions about your health    Make or cancel appointments    Discuss your medicines    Learn about your test results    Speak to your doctor            Additional Information About Your Visit        MyChart Information     [employer] is an electronic gateway that provides easy, online access to your medical records. With [employer], you can request a clinic appointment, read your test results, renew a prescription or communicate with your care team.     To sign up for Infot visit the website at [email or web address]   You will be asked to enter the access code listed below, as well as some personal information. Please follow the directions to create your username and password.     Your access code is: [email or web address]  Expires: 2018  2:48 PM     Your access code will  in 90 days. If you need help or a new code, please contact your [facility] or call [phone] for assistance.        Care EveryWhere ID     This is your Care EveryWhere ID. This could be used by other organizations to access your [place] medical records  [id number]         Blood Pressure from Last 3 Encounters:   18 111/54   18 123/82    Weight from Last 3 Encounters:   18 151 lb 7.3 oz (68.7 kg)   18 146 lb 9.6 oz (66.5 kg)              Today, you had the following     No orders found for display       Primary Care Provider Fax #    Physician No Ref-Primary [phone]       No address on file        Equal Access to  Services     [facility]: Hadii jessica elizabeth carmen Soraheemali, waaxda luqadaha, qaybta kaalmada adeegpeterdorene, jaquan mojicaduanechadd whitfield . So [facility] [phone].    ATENCIÓN: Si fidel andrews, tiene a waters disposición servicios gratuitos de asistencia lingüística. Llame al [phone].    We comply with applicable federal civil rights laws and [place] laws. We do not discriminate on the basis of race, color, national origin, age, disability, sex, sexual orientation, or gender identity.            Thank you!     Thank you for choosing [facility] EPILEPSY [facility]  for your care. Our goal is always to provide you with excellent care. Hearing back from our patients is one way we can continue to improve our services. Please take a few minutes to complete the written survey that you may receive in the mail after your visit with us. Thank you!             Your Updated Medication List - Protect others around you: Learn how to safely use, store and throw away your medicines at www.disposemymeds.org.          This list is accurate as of 5/29/18 12:44 PM.  Always use your most recent med list.                   Brand Name Dispense Instructions for use Diagnosis    busPIRone 15 MG tablet    BUSPAR     Take 22.5 mg by mouth 2 times daily        CALCIUM 600 PO      Take 600 mg by mouth daily        clonazePAM 1 MG tablet    klonoPIN     Take 1 tablet (1 mg) by mouth 2 times daily    Episodic altered awareness       desvenlafaxine 100 MG 24 hr tablet    KHEDEZLA     Take 100 mg by mouth daily        FIORICET -40 MG Caps   Generic drug:  Butalbital-APAP-Caffeine      Take 1 capsule by mouth q4-6 hours prn migraine        FYCOMPA 12 MG tablet   Generic drug:  perampanel      Take 12 mg by mouth At Bedtime        LORazepam 1 MG tablet    ATIVAN     Take 1 mg by mouth For seizure activity, may repeat once after 30 minutes if needed for continued seizure activity        Methylphenidate HCl ER 36 MG Tb24      Take 36 mg by mouth  daily        Milk Thistle 175 MG Caps      Take 175 mg by mouth daily        OXcarbazepine 300 MG tablet    TRILEPTAL    124 tablet    Take 1 tablet (300 mg) by mouth 2 times daily Increase by 300mg each week until taking 600mg twice daily    Intractable epilepsy without status epilepticus, unspecified epilepsy type (H)       prazosin 2 MG capsule    MINIPRESS     Take 6 mg by mouth At Bedtime        traZODone 50 MG tablet    DESYREL     Take 50 mg by mouth At Bedtime Take 2-4 tablets by mouth at bedtime        ZANTAC PO      Take 150 mg by mouth 2 times daily as needed for heartburn

## 2018-05-29 NOTE — PROGRESS NOTES
Patient was sleeping at time of call and information was obtained from .  Continues with spells as previously. Has had two major motor attacks since last visit. Continues with occasional staring attacks. Taking trileptal 1200 mg/d. Fycompa 6 mg/day.   believes that trileptal caused worsening nausea and that this was reduced at her follow up visit with Dr Beard.  believes that headaches have worsened. Continues working with psychotherapy.    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 May have additional epileptic seizures but neither we nor Richelle were able to record any. So cannot proceed with evaluation for epilepsy surgery.    Recommended ongoing treatment for pseudoseizures. If persisting strong signs of epilepsy consider another period of video-EEG monitoring after about half a year.    No plan to follow up at this point.

## 2019-05-09 NOTE — TELEPHONE ENCOUNTER
Called [facility] and spoke with Modesto MALIK No authorizations or precertification is needed for out patient services on 4/23/18 and 4/24/18.  CPT codes was all provided.   
20

## 2023-08-14 NOTE — PROGRESS NOTES
[facility], [place]   Epilepsy Service Daily Note      Interval History:   Had spells with staring and unresponsiveness. These had no EEG changes. Is interested in getting off sedating medications. No convulsive seizures thus far.     Review of System:   No nausea, No vomiting, no headaches, no dizziness, no chest pain.     Medications:   Antiepileptic Medications Home Doses: Fycompa 12 hs, topiramate 150-150  Antiepileptic Medications Current Doses: Fycompa 8 hs, topiramate 50-50    Exam: Blood pressure 129/64, pulse 81, temperature 97.5  F (36.4  C), temperature source Oral, resp. rate 16, weight 65.3 kg (144 lb), SpO2 97 %.   General: NAD  Head: NC/AT  Neuro: Alert and oriented. Speech fluent. Hearing intact to normal conversation. EOM's intact. Face symmetric. No distal weakness. No drift or pronation. Intact FNF.   EEG: normal thus far   Assessment and Plan:   1. Jaja Patton is a 37 year old right handed female with a reported h/o Arnold- Chiari malformation, intractable epilepsy since age 32, s/p VNS placement who is being admitted for characterization and presurgical evaluation. Spells of staring and unresponsiveness had no EEG changes. Need to record convulsive seizures.     -continue vEEG monitoring  -stop topiramate   -continue Fycompa 8 hs   -VNS has been turned OFF  -psychiatry consult       Zuly Naik PA-C    Type of target event identified: staring spell with altered awareness, convulsion  Number of events: more needed, 5  Discharge medication plan: To be decided  Further Imaging studies needed prior to discharge: possibly   Discharge transportation: not discussed  Other pertinent issues/goals for discharge: psychiatry consult     Total time: 15 minute was spent in the care of this patient. The patient agrees with the above mentioned plan of care. I answered all the patient's questions and addressed immediate concerns. More than 50% of time spent consisted of  Wound Clinic Physician Orders and Discharge [address] [facility] [address] [facility], [address] [facility]  Telephone: [phone]    NAME:  Truong Newman  YOB: 1960  MEDICAL RECORD NUMBER:  [id number]  DATE:  8/14/2023    Congratulations!! You have completed your treatment. 1. Return to your Primary Care Physician for all your health issues. 2. Resume your ordinary activities as tolerated. 3. Take your medications as prescribed by your primary care physician. 4. Check your skin daily for cracks, bruises, sores, or dryness. Use a moisturizer as needed. 5. Clean and dry your skin, using mild soap and warm water (not hot). 6. Avoid alcohol and caffeine and do not smoke. 7. Maintain a nutritious diet. 8. Avoid pressure on your wound site. Keep your legs elevated above the level of the heart whenever possible. 9. Wear well-fitting shoes and leg garments. 10. May wear your own shoe on the Right Foot. Gradually increase your activity -  MONITOR the newly healed area daily. THANK YOU FOR ALLOWING US TO SERVE YOU.  PLEASE CALL IF YOU DEVELOP ANOTHER WOUND. [phone]      Electronically signed by Iris Whyte DPM on 8/14/2023 at 11:42 AM counseling and coordinating care, including discussion of the diagnostic significance of EEG findings, anti-seizure medication management, and planning for discharge home

## 2024-06-17 PROBLEM — Z71.89 ADVANCED DIRECTIVES, COUNSELING/DISCUSSION: Status: RESOLVED | Noted: 2018-05-15 | Resolved: 2024-06-17

## (undated) RX ORDER — LIDOCAINE HYDROCHLORIDE 10 MG/ML
INJECTION, SOLUTION EPIDURAL; INFILTRATION; INTRACAUDAL; PERINEURAL
Status: DISPENSED
Start: 2018-05-08